# Patient Record
Sex: FEMALE | Race: WHITE
[De-identification: names, ages, dates, MRNs, and addresses within clinical notes are randomized per-mention and may not be internally consistent; named-entity substitution may affect disease eponyms.]

---

## 2017-01-03 ENCOUNTER — APPOINTMENT (OUTPATIENT)
Dept: ENDOCRINOLOGY | Facility: CLINIC | Age: 27
End: 2017-01-03

## 2017-03-15 ENCOUNTER — RX RENEWAL (OUTPATIENT)
Age: 27
End: 2017-03-15

## 2017-05-19 ENCOUNTER — RX RENEWAL (OUTPATIENT)
Age: 27
End: 2017-05-19

## 2017-09-06 ENCOUNTER — APPOINTMENT (OUTPATIENT)
Dept: ENDOCRINOLOGY | Facility: CLINIC | Age: 27
End: 2017-09-06
Payer: MEDICAID

## 2017-09-06 VITALS
HEART RATE: 69 BPM | SYSTOLIC BLOOD PRESSURE: 114 MMHG | BODY MASS INDEX: 35.14 KG/M2 | HEIGHT: 60 IN | WEIGHT: 179 LBS | OXYGEN SATURATION: 97 % | DIASTOLIC BLOOD PRESSURE: 70 MMHG

## 2017-09-06 PROCEDURE — 99214 OFFICE O/P EST MOD 30 MIN: CPT

## 2017-09-07 ENCOUNTER — MEDICATION RENEWAL (OUTPATIENT)
Age: 27
End: 2017-09-07

## 2017-09-07 LAB
25(OH)D3 SERPL-MCNC: 14.6 NG/ML
ALBUMIN SERPL ELPH-MCNC: 4.4 G/DL
ALP BLD-CCNC: 90 U/L
ALT SERPL-CCNC: 17 U/L
ANION GAP SERPL CALC-SCNC: 16 MMOL/L
AST SERPL-CCNC: 20 U/L
BILIRUB SERPL-MCNC: 0.2 MG/DL
BUN SERPL-MCNC: 11 MG/DL
CALCIUM SERPL-MCNC: 9.4 MG/DL
CHLORIDE SERPL-SCNC: 103 MMOL/L
CO2 SERPL-SCNC: 22 MMOL/L
CREAT SERPL-MCNC: 0.8 MG/DL
GLUCOSE SERPL-MCNC: 87 MG/DL
HBA1C MFR BLD HPLC: 4.9 %
POTASSIUM SERPL-SCNC: 4.3 MMOL/L
PROT SERPL-MCNC: 7.6 G/DL
SODIUM SERPL-SCNC: 141 MMOL/L
T4 FREE SERPL-MCNC: 2.3 NG/DL
TSH SERPL-ACNC: 0.06 UIU/ML

## 2017-11-30 ENCOUNTER — RX RENEWAL (OUTPATIENT)
Age: 27
End: 2017-11-30

## 2017-12-11 ENCOUNTER — RX RENEWAL (OUTPATIENT)
Age: 27
End: 2017-12-11

## 2018-03-01 ENCOUNTER — CLINICAL ADVICE (OUTPATIENT)
Age: 28
End: 2018-03-01

## 2018-03-12 ENCOUNTER — RX RENEWAL (OUTPATIENT)
Age: 28
End: 2018-03-12

## 2018-03-14 ENCOUNTER — APPOINTMENT (OUTPATIENT)
Dept: ENDOCRINOLOGY | Facility: CLINIC | Age: 28
End: 2018-03-14
Payer: MEDICAID

## 2018-03-14 VITALS
OXYGEN SATURATION: 98 % | WEIGHT: 173 LBS | HEART RATE: 70 BPM | BODY MASS INDEX: 33.96 KG/M2 | SYSTOLIC BLOOD PRESSURE: 110 MMHG | DIASTOLIC BLOOD PRESSURE: 70 MMHG | HEIGHT: 60 IN

## 2018-03-14 DIAGNOSIS — E28.2 POLYCYSTIC OVARIAN SYNDROME: ICD-10-CM

## 2018-03-14 DIAGNOSIS — E55.9 VITAMIN D DEFICIENCY, UNSPECIFIED: ICD-10-CM

## 2018-03-14 PROCEDURE — 99214 OFFICE O/P EST MOD 30 MIN: CPT

## 2018-03-15 LAB
25(OH)D3 SERPL-MCNC: 19.8 NG/ML
T4 FREE SERPL-MCNC: 1.7 NG/DL
TSH SERPL-ACNC: 1.65 UIU/ML

## 2018-03-15 RX ORDER — CHOLECALCIFEROL (VITAMIN D3) 1250 MCG
1.25 MG CAPSULE ORAL
Qty: 12 | Refills: 0 | Status: DISCONTINUED | COMMUNITY
Start: 2017-09-07 | End: 2018-03-15

## 2018-09-19 ENCOUNTER — APPOINTMENT (OUTPATIENT)
Dept: ENDOCRINOLOGY | Facility: CLINIC | Age: 28
End: 2018-09-19

## 2018-10-15 ENCOUNTER — APPOINTMENT (OUTPATIENT)
Dept: ENDOCRINOLOGY | Facility: CLINIC | Age: 28
End: 2018-10-15
Payer: MEDICAID

## 2018-10-15 VITALS
BODY MASS INDEX: 36.32 KG/M2 | HEART RATE: 66 BPM | SYSTOLIC BLOOD PRESSURE: 122 MMHG | DIASTOLIC BLOOD PRESSURE: 66 MMHG | HEIGHT: 60 IN | OXYGEN SATURATION: 98 % | WEIGHT: 185 LBS

## 2018-10-15 PROCEDURE — 99214 OFFICE O/P EST MOD 30 MIN: CPT

## 2018-10-16 ENCOUNTER — OTHER (OUTPATIENT)
Age: 28
End: 2018-10-16

## 2018-10-16 LAB
T4 SERPL-MCNC: 12.8 UG/DL
TSH SERPL-ACNC: 8.19 UIU/ML

## 2018-11-26 ENCOUNTER — APPOINTMENT (OUTPATIENT)
Dept: ENDOCRINOLOGY | Facility: CLINIC | Age: 28
End: 2018-11-26
Payer: MEDICAID

## 2018-11-26 VITALS
SYSTOLIC BLOOD PRESSURE: 120 MMHG | HEIGHT: 60 IN | WEIGHT: 186 LBS | OXYGEN SATURATION: 98 % | BODY MASS INDEX: 36.52 KG/M2 | DIASTOLIC BLOOD PRESSURE: 70 MMHG | HEART RATE: 88 BPM

## 2018-11-26 DIAGNOSIS — E89.0 POSTPROCEDURAL HYPOTHYROIDISM: ICD-10-CM

## 2018-11-26 DIAGNOSIS — Z33.3 PREGNANT STATE, GESTATIONAL CARRIER: ICD-10-CM

## 2018-11-26 PROCEDURE — 99214 OFFICE O/P EST MOD 30 MIN: CPT

## 2018-11-26 RX ORDER — LEVOTHYROXINE SODIUM 0.2 MG/1
200 TABLET ORAL DAILY
Qty: 30 | Refills: 4 | Status: ACTIVE | COMMUNITY
Start: 2018-11-26 | End: 1900-01-01

## 2018-11-27 LAB
T4 SERPL-MCNC: 13.1 UG/DL
TSH SERPL-ACNC: 2.32 UIU/ML

## 2019-03-09 ENCOUNTER — OUTPATIENT (OUTPATIENT)
Dept: OUTPATIENT SERVICES | Facility: HOSPITAL | Age: 29
LOS: 1 days | End: 2019-03-09
Payer: MEDICAID

## 2019-03-09 VITALS — TEMPERATURE: 98 F

## 2019-03-09 DIAGNOSIS — Z3A.00 WEEKS OF GESTATION OF PREGNANCY NOT SPECIFIED: ICD-10-CM

## 2019-03-09 DIAGNOSIS — O26.899 OTHER SPECIFIED PREGNANCY RELATED CONDITIONS, UNSPECIFIED TRIMESTER: ICD-10-CM

## 2019-03-09 PROCEDURE — 99213 OFFICE O/P EST LOW 20 MIN: CPT | Mod: 25

## 2019-03-09 PROCEDURE — 59025 FETAL NON-STRESS TEST: CPT | Mod: 26

## 2019-03-09 PROCEDURE — 76815 OB US LIMITED FETUS(S): CPT | Mod: 26

## 2019-03-12 ENCOUNTER — RX RENEWAL (OUTPATIENT)
Age: 29
End: 2019-03-12

## 2019-04-05 ENCOUNTER — OUTPATIENT (OUTPATIENT)
Dept: INPATIENT UNIT | Facility: HOSPITAL | Age: 29
LOS: 1 days | Discharge: HOME | End: 2019-04-05
Payer: MEDICAID

## 2019-04-05 ENCOUNTER — OUTPATIENT (OUTPATIENT)
Dept: OUTPATIENT SERVICES | Facility: HOSPITAL | Age: 29
LOS: 1 days | End: 2019-04-05

## 2019-04-05 VITALS
HEART RATE: 98 BPM | SYSTOLIC BLOOD PRESSURE: 112 MMHG | TEMPERATURE: 98 F | WEIGHT: 210.1 LBS | RESPIRATION RATE: 16 BRPM | HEIGHT: 60.5 IN | OXYGEN SATURATION: 99 % | DIASTOLIC BLOOD PRESSURE: 70 MMHG

## 2019-04-05 VITALS — TEMPERATURE: 97 F

## 2019-04-05 DIAGNOSIS — Z90.5 ACQUIRED ABSENCE OF KIDNEY: Chronic | ICD-10-CM

## 2019-04-05 DIAGNOSIS — Z98.891 HISTORY OF UTERINE SCAR FROM PREVIOUS SURGERY: Chronic | ICD-10-CM

## 2019-04-05 DIAGNOSIS — O26.899 OTHER SPECIFIED PREGNANCY RELATED CONDITIONS, UNSPECIFIED TRIMESTER: ICD-10-CM

## 2019-04-05 DIAGNOSIS — Z98.890 OTHER SPECIFIED POSTPROCEDURAL STATES: Chronic | ICD-10-CM

## 2019-04-05 DIAGNOSIS — O34.219 MATERNAL CARE FOR UNSPECIFIED TYPE SCAR FROM PREVIOUS CESAREAN DELIVERY: ICD-10-CM

## 2019-04-05 DIAGNOSIS — Z3A.00 WEEKS OF GESTATION OF PREGNANCY NOT SPECIFIED: ICD-10-CM

## 2019-04-05 LAB
ANION GAP SERPL CALC-SCNC: 13 MMO/L — SIGNIFICANT CHANGE UP (ref 7–14)
APPEARANCE UR: SIGNIFICANT CHANGE UP
BACTERIA # UR AUTO: NEGATIVE — SIGNIFICANT CHANGE UP
BASOPHILS # BLD AUTO: 0.02 K/UL — SIGNIFICANT CHANGE UP (ref 0–0.2)
BASOPHILS NFR BLD AUTO: 0.3 % — SIGNIFICANT CHANGE UP (ref 0–2)
BILIRUB UR-MCNC: NEGATIVE — SIGNIFICANT CHANGE UP
BLD GP AB SCN SERPL QL: NEGATIVE — SIGNIFICANT CHANGE UP
BLOOD UR QL VISUAL: NEGATIVE — SIGNIFICANT CHANGE UP
BUN SERPL-MCNC: 6 MG/DL — LOW (ref 7–23)
CALCIUM SERPL-MCNC: 8.9 MG/DL — SIGNIFICANT CHANGE UP (ref 8.4–10.5)
CHLORIDE SERPL-SCNC: 104 MMOL/L — SIGNIFICANT CHANGE UP (ref 98–107)
CO2 SERPL-SCNC: 22 MMOL/L — SIGNIFICANT CHANGE UP (ref 22–31)
COLOR SPEC: SIGNIFICANT CHANGE UP
CREAT SERPL-MCNC: 0.53 MG/DL — SIGNIFICANT CHANGE UP (ref 0.5–1.3)
EOSINOPHIL # BLD AUTO: 0.23 K/UL — SIGNIFICANT CHANGE UP (ref 0–0.5)
EOSINOPHIL NFR BLD AUTO: 2.9 % — SIGNIFICANT CHANGE UP (ref 0–6)
GLUCOSE BLDC GLUCOMTR-MCNC: 95 MG/DL — SIGNIFICANT CHANGE UP (ref 70–99)
GLUCOSE SERPL-MCNC: 32 MG/DL — CRITICAL LOW (ref 70–99)
GLUCOSE UR-MCNC: NEGATIVE — SIGNIFICANT CHANGE UP
HCG SERPL-ACNC: 2488 MIU/ML — SIGNIFICANT CHANGE UP
HCT VFR BLD CALC: 41.1 % — SIGNIFICANT CHANGE UP (ref 34.5–45)
HGB BLD-MCNC: 13.1 G/DL — SIGNIFICANT CHANGE UP (ref 11.5–15.5)
HYALINE CASTS # UR AUTO: NEGATIVE — SIGNIFICANT CHANGE UP
IMM GRANULOCYTES NFR BLD AUTO: 0.9 % — SIGNIFICANT CHANGE UP (ref 0–1.5)
KETONES UR-MCNC: NEGATIVE — SIGNIFICANT CHANGE UP
LEUKOCYTE ESTERASE UR-ACNC: SIGNIFICANT CHANGE UP
LYMPHOCYTES # BLD AUTO: 1.62 K/UL — SIGNIFICANT CHANGE UP (ref 1–3.3)
LYMPHOCYTES # BLD AUTO: 20.7 % — SIGNIFICANT CHANGE UP (ref 13–44)
MCHC RBC-ENTMCNC: 28.5 PG — SIGNIFICANT CHANGE UP (ref 27–34)
MCHC RBC-ENTMCNC: 31.9 % — LOW (ref 32–36)
MCV RBC AUTO: 89.5 FL — SIGNIFICANT CHANGE UP (ref 80–100)
MONOCYTES # BLD AUTO: 0.87 K/UL — SIGNIFICANT CHANGE UP (ref 0–0.9)
MONOCYTES NFR BLD AUTO: 11.1 % — SIGNIFICANT CHANGE UP (ref 2–14)
NEUTROPHILS # BLD AUTO: 5.03 K/UL — SIGNIFICANT CHANGE UP (ref 1.8–7.4)
NEUTROPHILS NFR BLD AUTO: 64.1 % — SIGNIFICANT CHANGE UP (ref 43–77)
NITRITE UR-MCNC: NEGATIVE — SIGNIFICANT CHANGE UP
NRBC # FLD: 0 K/UL — SIGNIFICANT CHANGE UP (ref 0–0)
PH UR: 6.5 — SIGNIFICANT CHANGE UP (ref 5–8)
PLATELET # BLD AUTO: 198 K/UL — SIGNIFICANT CHANGE UP (ref 150–400)
PMV BLD: 11.4 FL — SIGNIFICANT CHANGE UP (ref 7–13)
POTASSIUM SERPL-MCNC: 3.9 MMOL/L — SIGNIFICANT CHANGE UP (ref 3.5–5.3)
POTASSIUM SERPL-SCNC: 3.9 MMOL/L — SIGNIFICANT CHANGE UP (ref 3.5–5.3)
PROT UR-MCNC: NEGATIVE — SIGNIFICANT CHANGE UP
RBC # BLD: 4.59 M/UL — SIGNIFICANT CHANGE UP (ref 3.8–5.2)
RBC # FLD: 14.7 % — HIGH (ref 10.3–14.5)
RBC CASTS # UR COMP ASSIST: SIGNIFICANT CHANGE UP (ref 0–?)
RH IG SCN BLD-IMP: POSITIVE — SIGNIFICANT CHANGE UP
SODIUM SERPL-SCNC: 139 MMOL/L — SIGNIFICANT CHANGE UP (ref 135–145)
SP GR SPEC: 1.01 — SIGNIFICANT CHANGE UP (ref 1–1.04)
SQUAMOUS # UR AUTO: SIGNIFICANT CHANGE UP
UROBILINOGEN FLD QL: NORMAL — SIGNIFICANT CHANGE UP
WBC # BLD: 7.84 K/UL — SIGNIFICANT CHANGE UP (ref 3.8–10.5)
WBC # FLD AUTO: 7.84 K/UL — SIGNIFICANT CHANGE UP (ref 3.8–10.5)
WBC UR QL: HIGH (ref 0–?)

## 2019-04-05 PROCEDURE — 99214 OFFICE O/P EST MOD 30 MIN: CPT

## 2019-04-05 NOTE — OB RN TRIAGE NOTE - PSH
H/O left nephrectomy    H/O thyroidectomy    History of  section  2016 Female 8#1  History of incision and drainage  2016 after the c/s s/p seroma and fever

## 2019-04-05 NOTE — OB RN TRIAGE NOTE - PMH
Graves disease  at , s/p treatment with radioactive iodine, then thyroidectomy   (normal spontaneous vaginal delivery)  2012 FT Female 6#10  Polycystic kidney  left(S/P Nephrectomy )

## 2019-04-05 NOTE — OB PROVIDER TRIAGE NOTE - NS_CHIEFCOMPLAINTOTHER_OBGYN_ALL_OB_FT
serum glucose drawn 10am 4/5/19 resulted at 350p @ 4/5/19 : 35 mg/dl serum glucose drawn 10am 4/5/19 resulted at 350p @ 4/5/19 at PST:  32 mg/dl

## 2019-04-05 NOTE — PROVIDER CONTACT NOTE (CRITICAL VALUE NOTIFICATION) - ACTION/TREATMENT ORDERED:
Patient was called and assessed via phone. Unable to reach PCP & Dr. Nuñez (her regular OB/GYN) at present. Dr. James was made aware. Advised to go to urgent care for finger stick which patient agreed. Patient was called and assessed via phone. Unable to reach PCP & Dr. Nuñez (her regular OB/GYN) at present. Advised to go to urgent care for finger stick which patient agreed. Dr. James was made aware.

## 2019-04-05 NOTE — OB PROVIDER TRIAGE NOTE - NSOBPROVIDERNOTE_OBGYN_ALL_OB_FT
28 y.o.  AKHIL 19 @ 37.6 weeks r/o hypoglycemia. Serum glucose drawn 10am 19 resulted at 350p @ 19 at PST:  32 mg/dl. "Dr Jayson Laird called me and told me to go to hospital." Pt denies LOF, VB, ctx, dizziness, weakness, tremors. States +FM.    abdomen soft, nontender  nst reactive  toco: no ctx noted    VSS  FS 95    Discussed with Dr Ramos. Pt discharged home with labor precautions/fetal kick counts reviewed. Encouraged increased PO hydration. F/U next scheduled prenatal appointment 19.    Bret, NP

## 2019-04-05 NOTE — OB PST NOTE - NSANTHOSAYNRD_GEN_A_CORE
No. KAT screening performed.  STOP BANG Legend: 0-2 = LOW Risk; 3-4 = INTERMEDIATE Risk; 5-8 = HIGH Risk

## 2019-04-05 NOTE — OB RN TRIAGE NOTE - NS_MATERNALCONCERNS_OBGYN_ALL_OB_FT
Polycystic kidney of the left side (congenital) only right side kidney at present, S/P nephrectomy in 1993

## 2019-04-05 NOTE — OB PST NOTE - NSHPPHYSICALEXAM_GEN_ALL_CORE
Constitutional: Well Developed, Well Groomed, Well Nourished, No Distress    Eyes: PERRL, EOMI, conjunctiva clear    Ears: Normal    Mouth & Gums: Normal, moist    Pharynx: No tenderness, discharge, or peritonsillar abscess    Tonsils: No Redness, discharge, tenderness, or swelling    Neck: Supple    Breast: declined exam    Back: Normal shape, ROM intact, strength intact, no vertebral tenderness    Respiratory: Airway patent, breath sounds equal, good air movement, respiration non-labored, clear to auscultation bilateral, no chest wall tenderness, no intercostal retractions, no rales, no wheezes, no rhonchi, no subcutaneous emphysema    Cardiovascular:  Regular rate and rhythm, no rubs or murmur, normal PMI    Gastrointestinal: Soft, non-tender, non distention, no masses palpable, bowel sound normal, , no rebound tenderness    Extremities: 1+ pedal edema, No clubbing, cyanosis    Vascular:  varicose veins noted    Neurological: alert & oriented x 3, sensation intact,  normal strength    Skin: warm and dry, normal color    Lymph Nodes: normal posterior cervical lymph node, normal anterior cervical lymph node, normal supraclavicular lymph node,     Musculoskeletal: ROM intact, no joint swelling, warmth, or calf tenderness. Normal strength    Psychiatric: normal affect, normal behavior

## 2019-04-05 NOTE — OB PST NOTE - PROBLEM SELECTOR PLAN 1
Pt scheduled for surgery on 4/20/19.  Pre-op instructions provided. Pt verbalized understanding.   Chlorhexidine wash provided and instructions given.   Pt advised medication instructions per OB.

## 2019-04-05 NOTE — OB RN TRIAGE NOTE - NS_FETALANOMALIESDETAILS_OBGYN_ALL_OB_FT
Need to be followed up by the peds after th ebirth of the baby Need to be followed up by the peds after the birth of the baby

## 2019-04-05 NOTE — OB PROVIDER TRIAGE NOTE - ADDITIONAL INSTRUCTIONS
Please go to your next scheduled prenatal appointment on 4/9/19. Please drink 1-2 liters of fluid per day.

## 2019-04-05 NOTE — OB PST NOTE - HISTORY OF PRESENT ILLNESS
28 year old female presents for presurgical evaluation for Repeat  Section scheduled on 19. 28 year old female presents for presurgical evaluation for Repeat  Section scheduled on 19.     NO  WORKSHEET AVAILABLE - INFORMATION PER PATIENT

## 2019-04-05 NOTE — OB PST NOTE - NSHPREVIEWOFSYSTEMS_GEN_ALL_CORE
General: No fever, chills, sweating, anorexia, weight loss or weight gain. No polyphagia, polyurea, polydypsia, malaise, or fatigue    Skin: No rashes, itching, or dryness. No change in size/color of moles. No tumors, brittle nails, pitted nails, or hair loss    Breast: No tenderness, lumps, or nipple discharge      Ophthalmologic: No diplopia, photophobia, lacrimation, blurred Vision , or eye discharge    ENMT Symptoms: seasonal allergies, some sinus congestion. No hearing difficulty, ear pain, tinnitus, or vertigo.    Respiratory and Thorax: No wheezing, dyspnea, cough, hemoptysis, or pleuritic chest pain     Cardiovascular: Pedal edema with pregnancy. No chest pain, palpitations, dyspnea on exertion, orthopnea, paroxysmal nocturnal dyspnea,   or claudication    Gastrointestinal: No nausea, vomiting, diarrhea, constipation, change in bowel habits, flatulence, abdominal pain, or melena    Genitourinary/ Pelvis: No hematuria, renal colic, or flank pain.  No urine discoloration, incontinence, dysuria, or urinary hesitancy. Normal urinary frequency. No nocturia, abnormal vaginal bleeding, vaginal discharge, spotting, pelvic pain, or vaginal leakage    Musculoskeletal: No arthralgia, arthritis, joint swelling, muscle cramping, muscle weakness, neck pain, arm pain, or leg pain    Neurological: Parathesias to right leg, fingers - with pregnancy. No transient paralysis, weakness, , or seizures. No syncope, tremors, vertigo, loss of sensation, difficulty walking, loss of consciousness, hemiparesis, confusion, or facial palsy    Psychiatric: Anxiety - no meds. No suicidal ideation, depression, insomnia, memory loss, paranoia, mood swings, agitation, hallucinations, or hyperactivity    Hematology: No gum bleeding, nose bleeding, or skin lumps    Lymphatic: No enlarged or tender lymph nodes. No extremity swelling    Endocrine: No heat or cold intolerance    Immunologic: No recurrent or persistent infections    Vascular - varicose veins towards end of pregnancy General: No fever, chills, sweating, anorexia. No polyphagia, polyurea, polydypsia, malaise, or fatigue    Skin: No rashes, itching, or dryness. No change in size/color of moles. No tumors, brittle nails, pitted nails, or hair loss    Breast: No tenderness, lumps, or nipple discharge      Ophthalmologic: No diplopia, photophobia, lacrimation, blurred Vision , or eye discharge    ENMT Symptoms: seasonal allergies, some sinus congestion. No hearing difficulty, ear pain, tinnitus, or vertigo.    Respiratory and Thorax: No wheezing, dyspnea, cough, hemoptysis, or pleuritic chest pain     Cardiovascular: Pedal edema with pregnancy. No chest pain, palpitations, dyspnea on exertion, orthopnea, paroxysmal nocturnal dyspnea,   or claudication    Gastrointestinal: No nausea, vomiting, diarrhea, constipation, change in bowel habits, flatulence, abdominal pain, or melena    Genitourinary/ Pelvis: No hematuria, renal colic, or flank pain.  No urine discoloration, incontinence, dysuria, or urinary hesitancy. Normal urinary frequency. No nocturia, abnormal vaginal bleeding, vaginal discharge, spotting, pelvic pain, or vaginal leakage    Musculoskeletal: No arthralgia, arthritis, joint swelling, muscle cramping, muscle weakness, neck pain, arm pain, or leg pain    Neurological: Parathesias to right leg, fingers - with pregnancy. No transient paralysis, weakness, , or seizures. No syncope, tremors, vertigo, loss of sensation, difficulty walking, loss of consciousness, hemiparesis, confusion, or facial palsy    Psychiatric: Anxiety - no meds. No suicidal ideation, depression, insomnia, memory loss, paranoia, mood swings, agitation, hallucinations, or hyperactivity    Hematology: No gum bleeding, nose bleeding, or skin lumps    Lymphatic: No enlarged or tender lymph nodes. No extremity swelling    Endocrine: No heat or cold intolerance    Immunologic: No recurrent or persistent infections    Vascular - varicose veins towards end of pregnancy

## 2019-04-05 NOTE — OB RN TRIAGE NOTE - CHIEF COMPLAINT QUOTE
"I was called by the presurgical testing for glucose 35, and my doctor as ked me to come and check in triage" "I was called by the presurgical testing for glucose 35, and my doctor asked me to come and check in triage"

## 2019-04-05 NOTE — PROVIDER CONTACT NOTE (CRITICAL VALUE NOTIFICATION) - BACKGROUND
29 yo scheduled for repeat  section on 2019. Patient has hx of graves disease, s/o treatment with radioactive iodine & thyroidectomy, polycystic kidney s/p left nephrectomy

## 2019-04-06 LAB — T PALLIDUM AB TITR SER: NEGATIVE — SIGNIFICANT CHANGE UP

## 2019-04-09 ENCOUNTER — OUTPATIENT (OUTPATIENT)
Dept: OUTPATIENT SERVICES | Facility: HOSPITAL | Age: 29
LOS: 1 days | Discharge: HOME | End: 2019-04-09
Payer: MEDICAID

## 2019-04-09 VITALS — TEMPERATURE: 99 F | HEART RATE: 94 BPM | DIASTOLIC BLOOD PRESSURE: 67 MMHG | SYSTOLIC BLOOD PRESSURE: 127 MMHG

## 2019-04-09 VITALS
HEART RATE: 94 BPM | SYSTOLIC BLOOD PRESSURE: 127 MMHG | TEMPERATURE: 99 F | RESPIRATION RATE: 16 BRPM | DIASTOLIC BLOOD PRESSURE: 67 MMHG

## 2019-04-09 DIAGNOSIS — Z98.890 OTHER SPECIFIED POSTPROCEDURAL STATES: Chronic | ICD-10-CM

## 2019-04-09 DIAGNOSIS — O26.899 OTHER SPECIFIED PREGNANCY RELATED CONDITIONS, UNSPECIFIED TRIMESTER: ICD-10-CM

## 2019-04-09 DIAGNOSIS — Z3A.00 WEEKS OF GESTATION OF PREGNANCY NOT SPECIFIED: ICD-10-CM

## 2019-04-09 DIAGNOSIS — Z90.5 ACQUIRED ABSENCE OF KIDNEY: Chronic | ICD-10-CM

## 2019-04-09 DIAGNOSIS — Z98.891 HISTORY OF UTERINE SCAR FROM PREVIOUS SURGERY: Chronic | ICD-10-CM

## 2019-04-09 PROCEDURE — 59025 FETAL NON-STRESS TEST: CPT | Mod: 26

## 2019-04-09 NOTE — OB PROVIDER TRIAGE NOTE - HISTORY OF PRESENT ILLNESS
JOHN IVORY  28y  Female [id number]  28y  at 38w2d sent to triage by OB for NST/BPP due to nuchal presentation. Pt also states feeling some irregular, mild contractions.   Reports +FM, no vaginal bleeding, no ROM or LOF  AP complications: Denies    Fetus in breech presentation, Scheduled for repeat C/s on 19

## 2019-04-09 NOTE — OB PROVIDER TRIAGE NOTE - NSHPPHYSICALEXAM_GEN_ALL_CORE
Vital Signs Last 24 Hrs  T(C): 37.1 (09 Apr 2019 16:48), Max: 37.1 (09 Apr 2019 16:23)  T(F): 98.78 (09 Apr 2019 16:48), Max: 98.8 (09 Apr 2019 16:23)  HR: 94 (09 Apr 2019 16:48) (94 - 94)  BP: 127/67 (09 Apr 2019 16:48) (127/67 - 127/67)  BP(mean): --  RR: 16 (09 Apr 2019 16:23) (16 - 16)    Abdomen: Gravidm soft, NT  NST: Reactive, mod variability, no decels  [address]: no contractions  VE: closed/40/post/high  TAS: SLIUP, Breech, placenta anterior, BPP 8/8, DAFNE: 15

## 2019-04-09 NOTE — OB PROVIDER TRIAGE NOTE - NSOBPROVIDERNOTE_OBGYN_ALL_OB_FT
29 y/o  at 38w3d sent to triage by OB for evaluation due to    desires    PNC: Jayson Mcqueen  AP Course uncomplicated per patient    MSH/GYN/OBH:  nsd- 6-10   c/s-- 8-1 oligo fetal distress and failure to dilate (Arrest at 8 cm)  1 MAB    Desires      L kidney- removed @ 2 yr of age  Graves Disease -thyroidectomy/ on synthroid  Anxiety Disorder w/ counseling in the past (No MEDS)    On evaluation:  NAD  VSS/Afebrile  Abdomen is soft NT/ Gravid with increased BMI  NST in Progress  TAS:   FHR: Cat 1, Reactive  TOCO: No CTX  - Case Dw  (OB Covering Attending)   -Patient acknowledges good FM's with reassuring  maternal /fetal testing   - Okay for DC with fetal kick counts and OB F/U as scheduled with Dr Mcqueen   - Patient aware that Dr Mcqueen is to schedule a 40 wk repeat C/S if not delivered   - Verbalized understanding

## 2019-04-09 NOTE — OB PROVIDER TRIAGE NOTE - ADDITIONAL INSTRUCTIONS
Patient discharged home with instructions  Labor precautions  Pt to monitor fetal kick counts  Pt to follow up with OB as scheduled Patient discharged home with instructions  As per Dr Calloway patient needs to follow up with OB tomorrow morning  Labor precautions  Pt to monitor fetal kick counts

## 2019-04-09 NOTE — OB RN TRIAGE NOTE - CHIEF COMPLAINT QUOTE
sched c/s on 4/20, breech , c/o mild ctxs,nervous about cord prolapse . states dr blackwell told her that umbilical cord is presenting on sono

## 2019-04-10 PROBLEM — Q61.3 POLYCYSTIC KIDNEY, UNSPECIFIED: Chronic | Status: ACTIVE | Noted: 2019-04-05

## 2019-04-10 PROBLEM — E05.00 THYROTOXICOSIS WITH DIFFUSE GOITER WITHOUT THYROTOXIC CRISIS OR STORM: Chronic | Status: ACTIVE | Noted: 2019-04-05

## 2019-04-12 ENCOUNTER — APPOINTMENT (OUTPATIENT)
Dept: ANTEPARTUM | Facility: CLINIC | Age: 29
End: 2019-04-12
Payer: MEDICAID

## 2019-04-12 ENCOUNTER — ASOB RESULT (OUTPATIENT)
Age: 29
End: 2019-04-12

## 2019-04-12 PROCEDURE — 76819 FETAL BIOPHYS PROFIL W/O NST: CPT

## 2019-04-12 PROCEDURE — 76817 TRANSVAGINAL US OBSTETRIC: CPT

## 2019-04-12 PROCEDURE — 76811 OB US DETAILED SNGL FETUS: CPT

## 2019-04-19 ENCOUNTER — TRANSCRIPTION ENCOUNTER (OUTPATIENT)
Age: 29
End: 2019-04-19

## 2019-04-20 ENCOUNTER — RESULT REVIEW (OUTPATIENT)
Age: 29
End: 2019-04-20

## 2019-04-20 ENCOUNTER — INPATIENT (INPATIENT)
Facility: HOSPITAL | Age: 29
LOS: 2 days | Discharge: HOME | End: 2019-04-23
Attending: SPECIALIST | Admitting: SPECIALIST
Payer: MEDICAID

## 2019-04-20 ENCOUNTER — TRANSCRIPTION ENCOUNTER (OUTPATIENT)
Age: 29
End: 2019-04-20

## 2019-04-20 VITALS
HEIGHT: 60 IN | HEART RATE: 64 BPM | TEMPERATURE: 98 F | SYSTOLIC BLOOD PRESSURE: 103 MMHG | WEIGHT: 210.1 LBS | RESPIRATION RATE: 16 BRPM | DIASTOLIC BLOOD PRESSURE: 51 MMHG

## 2019-04-20 DIAGNOSIS — Z98.891 HISTORY OF UTERINE SCAR FROM PREVIOUS SURGERY: Chronic | ICD-10-CM

## 2019-04-20 DIAGNOSIS — Z98.890 OTHER SPECIFIED POSTPROCEDURAL STATES: Chronic | ICD-10-CM

## 2019-04-20 DIAGNOSIS — O34.219 MATERNAL CARE FOR UNSPECIFIED TYPE SCAR FROM PREVIOUS CESAREAN DELIVERY: ICD-10-CM

## 2019-04-20 DIAGNOSIS — Z90.5 ACQUIRED ABSENCE OF KIDNEY: Chronic | ICD-10-CM

## 2019-04-20 LAB
BASOPHILS # BLD AUTO: 0.04 K/UL — SIGNIFICANT CHANGE UP (ref 0–0.2)
BASOPHILS NFR BLD AUTO: 0.5 % — SIGNIFICANT CHANGE UP (ref 0–2)
BLD GP AB SCN SERPL QL: NEGATIVE — SIGNIFICANT CHANGE UP
EOSINOPHIL # BLD AUTO: 0.22 K/UL — SIGNIFICANT CHANGE UP (ref 0–0.5)
EOSINOPHIL NFR BLD AUTO: 2.6 % — SIGNIFICANT CHANGE UP (ref 0–6)
HCT VFR BLD CALC: 40.6 % — SIGNIFICANT CHANGE UP (ref 34.5–45)
HGB BLD-MCNC: 13 G/DL — SIGNIFICANT CHANGE UP (ref 11.5–15.5)
IMM GRANULOCYTES NFR BLD AUTO: 1 % — SIGNIFICANT CHANGE UP (ref 0–1.5)
LYMPHOCYTES # BLD AUTO: 1.74 K/UL — SIGNIFICANT CHANGE UP (ref 1–3.3)
LYMPHOCYTES # BLD AUTO: 20.7 % — SIGNIFICANT CHANGE UP (ref 13–44)
MCHC RBC-ENTMCNC: 27.9 PG — SIGNIFICANT CHANGE UP (ref 27–34)
MCHC RBC-ENTMCNC: 32 % — SIGNIFICANT CHANGE UP (ref 32–36)
MCV RBC AUTO: 87.1 FL — SIGNIFICANT CHANGE UP (ref 80–100)
MONOCYTES # BLD AUTO: 0.61 K/UL — SIGNIFICANT CHANGE UP (ref 0–0.9)
MONOCYTES NFR BLD AUTO: 7.3 % — SIGNIFICANT CHANGE UP (ref 2–14)
NEUTROPHILS # BLD AUTO: 5.7 K/UL — SIGNIFICANT CHANGE UP (ref 1.8–7.4)
NEUTROPHILS NFR BLD AUTO: 67.9 % — SIGNIFICANT CHANGE UP (ref 43–77)
NRBC # FLD: 0 K/UL — SIGNIFICANT CHANGE UP (ref 0–0)
PLATELET # BLD AUTO: 212 K/UL — SIGNIFICANT CHANGE UP (ref 150–400)
PMV BLD: 11.1 FL — SIGNIFICANT CHANGE UP (ref 7–13)
RBC # BLD: 4.66 M/UL — SIGNIFICANT CHANGE UP (ref 3.8–5.2)
RBC # FLD: 14.4 % — SIGNIFICANT CHANGE UP (ref 10.3–14.5)
RH IG SCN BLD-IMP: POSITIVE — SIGNIFICANT CHANGE UP
WBC # BLD: 8.39 K/UL — SIGNIFICANT CHANGE UP (ref 3.8–10.5)
WBC # FLD AUTO: 8.39 K/UL — SIGNIFICANT CHANGE UP (ref 3.8–10.5)

## 2019-04-20 PROCEDURE — 88307 TISSUE EXAM BY PATHOLOGIST: CPT | Mod: 26

## 2019-04-20 PROCEDURE — 88302 TISSUE EXAM BY PATHOLOGIST: CPT | Mod: 26

## 2019-04-20 RX ORDER — OXYCODONE HYDROCHLORIDE 5 MG/1
10 TABLET ORAL
Qty: 0 | Refills: 0 | Status: DISCONTINUED | OUTPATIENT
Start: 2019-04-20 | End: 2019-04-20

## 2019-04-20 RX ORDER — HYDROMORPHONE HYDROCHLORIDE 2 MG/ML
0.5 INJECTION INTRAMUSCULAR; INTRAVENOUS; SUBCUTANEOUS
Qty: 0 | Refills: 0 | Status: DISCONTINUED | OUTPATIENT
Start: 2019-04-20 | End: 2019-04-21

## 2019-04-20 RX ORDER — METOCLOPRAMIDE HCL 10 MG
10 TABLET ORAL ONCE
Qty: 0 | Refills: 0 | Status: COMPLETED | OUTPATIENT
Start: 2019-04-20 | End: 2019-04-20

## 2019-04-20 RX ORDER — SODIUM CHLORIDE 9 MG/ML
1000 INJECTION, SOLUTION INTRAVENOUS ONCE
Qty: 0 | Refills: 0 | Status: COMPLETED | OUTPATIENT
Start: 2019-04-20 | End: 2019-04-20

## 2019-04-20 RX ORDER — OXYCODONE HYDROCHLORIDE 5 MG/1
5 TABLET ORAL
Qty: 0 | Refills: 0 | Status: DISCONTINUED | OUTPATIENT
Start: 2019-04-20 | End: 2019-04-20

## 2019-04-20 RX ORDER — LEVOTHYROXINE SODIUM 125 MCG
200 TABLET ORAL DAILY
Qty: 0 | Refills: 0 | Status: DISCONTINUED | OUTPATIENT
Start: 2019-04-20 | End: 2019-04-20

## 2019-04-20 RX ORDER — NALBUPHINE HYDROCHLORIDE 10 MG/ML
5 INJECTION, SOLUTION INTRAMUSCULAR; INTRAVENOUS; SUBCUTANEOUS EVERY 6 HOURS
Qty: 0 | Refills: 0 | Status: DISCONTINUED | OUTPATIENT
Start: 2019-04-20 | End: 2019-04-22

## 2019-04-20 RX ORDER — LEVOTHYROXINE SODIUM 125 MCG
1 TABLET ORAL
Qty: 0 | Refills: 0 | COMMUNITY

## 2019-04-20 RX ORDER — ONDANSETRON 8 MG/1
4 TABLET, FILM COATED ORAL EVERY 6 HOURS
Qty: 0 | Refills: 0 | Status: DISCONTINUED | OUTPATIENT
Start: 2019-04-20 | End: 2019-04-22

## 2019-04-20 RX ORDER — NALOXONE HYDROCHLORIDE 4 MG/.1ML
0.1 SPRAY NASAL
Qty: 0 | Refills: 0 | Status: DISCONTINUED | OUTPATIENT
Start: 2019-04-20 | End: 2019-04-22

## 2019-04-20 RX ORDER — HYDROMORPHONE HYDROCHLORIDE 2 MG/ML
1 INJECTION INTRAMUSCULAR; INTRAVENOUS; SUBCUTANEOUS
Qty: 0 | Refills: 0 | Status: DISCONTINUED | OUTPATIENT
Start: 2019-04-20 | End: 2019-04-20

## 2019-04-20 RX ORDER — CITRIC ACID/SODIUM CITRATE 300-500 MG
30 SOLUTION, ORAL ORAL ONCE
Qty: 0 | Refills: 0 | Status: COMPLETED | OUTPATIENT
Start: 2019-04-20 | End: 2019-04-20

## 2019-04-20 RX ORDER — ACETAMINOPHEN 500 MG
975 TABLET ORAL ONCE
Qty: 0 | Refills: 0 | Status: COMPLETED | OUTPATIENT
Start: 2019-04-20 | End: 2019-04-20

## 2019-04-20 RX ORDER — HYDROMORPHONE HYDROCHLORIDE 2 MG/ML
0.25 INJECTION INTRAMUSCULAR; INTRAVENOUS; SUBCUTANEOUS
Qty: 0 | Refills: 0 | Status: DISCONTINUED | OUTPATIENT
Start: 2019-04-20 | End: 2019-04-21

## 2019-04-20 RX ORDER — MORPHINE SULFATE 50 MG/1
0.2 CAPSULE, EXTENDED RELEASE ORAL ONCE
Qty: 0 | Refills: 0 | Status: DISCONTINUED | OUTPATIENT
Start: 2019-04-20 | End: 2019-04-22

## 2019-04-20 RX ORDER — FAMOTIDINE 10 MG/ML
20 INJECTION INTRAVENOUS ONCE
Qty: 0 | Refills: 0 | Status: COMPLETED | OUTPATIENT
Start: 2019-04-20 | End: 2019-04-20

## 2019-04-20 RX ADMIN — SODIUM CHLORIDE 2000 MILLILITER(S): 9 INJECTION, SOLUTION INTRAVENOUS at 18:17

## 2019-04-20 RX ADMIN — Medication 10 MILLIGRAM(S): at 12:36

## 2019-04-20 RX ADMIN — Medication 30 MILLILITER(S): at 18:17

## 2019-04-20 RX ADMIN — Medication 975 MILLIGRAM(S): at 17:00

## 2019-04-20 RX ADMIN — FAMOTIDINE 20 MILLIGRAM(S): 10 INJECTION INTRAVENOUS at 12:35

## 2019-04-20 NOTE — DISCHARGE NOTE OB - MEDICATION SUMMARY - MEDICATIONS TO CHANGE
I will SWITCH the dose or number of times a day I take the medications listed below when I get home from the hospital:    Synthroid 200 mcg (0.2 mg) oral tablet  -- 1 tab(s) by mouth once a day

## 2019-04-20 NOTE — DISCHARGE NOTE OB - MEDICATION SUMMARY - MEDICATIONS TO STOP TAKING
I will STOP taking the medications listed below when I get home from the hospital:    raspberry leaf tea capsules  -- 1 cap(s) by mouth 3 times a day, last dose 4/12/19    Prenatal Multivitamins oral tablet  -- 1 tab(s) by mouth once a day, last dose 4/12/19

## 2019-04-20 NOTE — H&P ADULT - ASSESSMENT
28y  at 40w0d AKHIL 19 prior C section desiring permanent sterilization    - for repeat C section  - surgical consent by Dr. Calloway  - accepts blood transfusion  - preop labs/ meds  - NST prior to OR    S [place], R3

## 2019-04-20 NOTE — H&P ADULT - NSICDXPASTSURGICALHX_GEN_ALL_CORE_FT
PAST SURGICAL HISTORY:  H/O left nephrectomy     H/O thyroidectomy 2015    History of  section 2016 Female 8#1    History of incision and drainage 2016 after the c/s s/p seroma and fever

## 2019-04-20 NOTE — OB NEONATOLOGY/PEDIATRICIAN DELIVERY SUMMARY - NSPEDSNEONOTESA_OBGYN_ALL_OB_FT
Baby is a 40w GA male born to a 27yo  via repeat . Maternal history is complicated by polycystic kidney disease, left nephrectomy, proteinuria, and Grave's disease (s/p thyroidectomy) on Synthroid. Pregnancy history is complicated by a fetal alert of horseshoe kidney that is reported to have resolved. Prenatal labs were negative, immune, and non-reactive. GBS positive. No rupture, no labor. Baby emerged vigorous and crying. Warmed, dried, suctioned, and stimulated. Voided x1 and meconium x1. Apgar 9/9.

## 2019-04-20 NOTE — DISCHARGE NOTE OB - CARE PLAN
Principal Discharge DX:	Encounter for tubal ligation  Goal:	home  Assessment and plan of treatment:	rtc 6 wks Principal Discharge DX:	Encounter for tubal ligation  Goal:	home  Assessment and plan of treatment:	rtc 2 weeks

## 2019-04-20 NOTE — DISCHARGE NOTE OB - CARE PROVIDER_API CALL
Pedro Baxter)  Obstetrics and Gynecology  [address] [place] [address]  Phone: [phone]  Fax: [phone]  Follow Up Time: Poonam Fisher)  Obstetrics and Gynecology  [facility] [place] [address]  Phone: [phone]  Fax: [phone]  Follow Up Time:

## 2019-04-20 NOTE — DISCHARGE NOTE OB - PATIENT PORTAL LINK FT
You can access the [facility] Patient Portal, offered by [facility], by registering with the following website: [email or web address]

## 2019-04-20 NOTE — H&P ADULT - NSICDXPASTMEDICALHX_GEN_ALL_CORE_FT
PAST MEDICAL HISTORY:  Graves disease at , s/p treatment with radioactive iodine, then thyroidectomy     (normal spontaneous vaginal delivery)  FT Female 6#10    Polycystic kidney left(S/P Nephrectomy )

## 2019-04-20 NOTE — DISCHARGE NOTE OB - MEDICATION SUMMARY - MEDICATIONS TO TAKE
I will START or STAY ON the medications listed below when I get home from the hospital:  None I will START or STAY ON the medications listed below when I get home from the hospital:    acetaminophen 325 mg oral tablet  -- 3 tab(s) by mouth every 6 hours  -- Indication: For  delivery delivered    ibuprofen 600 mg oral tablet  -- 1 tab(s) by mouth every 6 hours  -- Indication: For  delivery delivered    levothyroxine 200 mcg (0.2 mg) oral tablet  -- 1 tab(s) by mouth once a day  -- Indication: For routine

## 2019-04-20 NOTE — OB PROVIDER DELIVERY SUMMARY - NSPROVIDERDELIVERYNOTE_OBGYN_ALL_OB_FT
r/c/s and tubal ligation r/c/s and tubal ligation    Gravid uterus, grossly normal appearing bilateral fallopian tubes and ovaries. Liveborn male infant delivered w/ vacuum assist, apgars 9/9. Bilateral tubal ligation performed by modified nikkie technique. Patient went to recovery in stable condition. r/c/s and tubal ligation    Gravid uterus, grossly normal appearing bilateral fallopian tubes and ovaries. Liveborn male infant delivered w/ vacuum assist, apgars 9/9. Bilateral tubal ligation performed by modified nikkie technique. Placenta,portion of left fallopian tube and portion of right fallopian tube sent to pathology. Patient went to recovery in stable condition.

## 2019-04-20 NOTE — H&P ADULT - HISTORY OF PRESENT ILLNESS
28y  at 40w0d AKHIL 19 prior C section, presenting for scheduled repeat C section and BTL. Patient reports irregular contractions, and denies LOF or vaginal bleeding. She feels active fetal movements.       OBHx:  x2 , 2016 emergent C/S   PMSH: Graves disease s/p SOSA, thyroidectomy , left nephrectomy as a  (PKD)  Meds: synthroid 200mcg qd, prenatal vitamins  NKDA  SH: denies smoking, alcohol or other drugs. Feels safe at home    PE  Gen: Comfortable, NAD  Abd: gravid, nontender  Ext: warm/well perfused, no calf tenderness bilaterally

## 2019-04-21 LAB
HCT VFR BLD CALC: 36.1 % — SIGNIFICANT CHANGE UP (ref 34.5–45)
HGB BLD-MCNC: 11.6 G/DL — SIGNIFICANT CHANGE UP (ref 11.5–15.5)
MCHC RBC-ENTMCNC: 27.6 PG — SIGNIFICANT CHANGE UP (ref 27–34)
MCHC RBC-ENTMCNC: 32.1 % — SIGNIFICANT CHANGE UP (ref 32–36)
MCV RBC AUTO: 86 FL — SIGNIFICANT CHANGE UP (ref 80–100)
NRBC # FLD: 0 K/UL — SIGNIFICANT CHANGE UP (ref 0–0)
PLATELET # BLD AUTO: 170 K/UL — SIGNIFICANT CHANGE UP (ref 150–400)
PMV BLD: 10.7 FL — SIGNIFICANT CHANGE UP (ref 7–13)
RBC # BLD: 4.2 M/UL — SIGNIFICANT CHANGE UP (ref 3.8–5.2)
RBC # FLD: 14.3 % — SIGNIFICANT CHANGE UP (ref 10.3–14.5)
T PALLIDUM AB TITR SER: NEGATIVE — SIGNIFICANT CHANGE UP
WBC # BLD: 13.33 K/UL — HIGH (ref 3.8–10.5)
WBC # FLD AUTO: 13.33 K/UL — HIGH (ref 3.8–10.5)

## 2019-04-21 RX ORDER — FERROUS SULFATE 325(65) MG
325 TABLET ORAL DAILY
Qty: 0 | Refills: 0 | Status: DISCONTINUED | OUTPATIENT
Start: 2019-04-21 | End: 2019-04-23

## 2019-04-21 RX ORDER — DIPHENHYDRAMINE HCL 50 MG
25 CAPSULE ORAL EVERY 6 HOURS
Qty: 0 | Refills: 0 | Status: DISCONTINUED | OUTPATIENT
Start: 2019-04-21 | End: 2019-04-23

## 2019-04-21 RX ORDER — LANOLIN
1 OINTMENT (GRAM) TOPICAL
Qty: 0 | Refills: 0 | Status: DISCONTINUED | OUTPATIENT
Start: 2019-04-21 | End: 2019-04-23

## 2019-04-21 RX ORDER — SODIUM CHLORIDE 9 MG/ML
1000 INJECTION, SOLUTION INTRAVENOUS
Qty: 0 | Refills: 0 | Status: DISCONTINUED | OUTPATIENT
Start: 2019-04-21 | End: 2019-04-21

## 2019-04-21 RX ORDER — DOCUSATE SODIUM 100 MG
100 CAPSULE ORAL
Qty: 0 | Refills: 0 | Status: DISCONTINUED | OUTPATIENT
Start: 2019-04-21 | End: 2019-04-23

## 2019-04-21 RX ORDER — HEPARIN SODIUM 5000 [USP'U]/ML
5000 INJECTION INTRAVENOUS; SUBCUTANEOUS EVERY 12 HOURS
Qty: 0 | Refills: 0 | Status: DISCONTINUED | OUTPATIENT
Start: 2019-04-21 | End: 2019-04-23

## 2019-04-21 RX ORDER — GLYCERIN ADULT
1 SUPPOSITORY, RECTAL RECTAL AT BEDTIME
Qty: 0 | Refills: 0 | Status: DISCONTINUED | OUTPATIENT
Start: 2019-04-21 | End: 2019-04-23

## 2019-04-21 RX ORDER — OXYCODONE HYDROCHLORIDE 5 MG/1
5 TABLET ORAL
Qty: 0 | Refills: 0 | Status: DISCONTINUED | OUTPATIENT
Start: 2019-04-21 | End: 2019-04-23

## 2019-04-21 RX ORDER — OXYTOCIN 10 UNIT/ML
125 VIAL (ML) INJECTION
Qty: 20 | Refills: 0 | Status: DISCONTINUED | OUTPATIENT
Start: 2019-04-21 | End: 2019-04-22

## 2019-04-21 RX ORDER — TETANUS TOXOID, REDUCED DIPHTHERIA TOXOID AND ACELLULAR PERTUSSIS VACCINE, ADSORBED 5; 2.5; 8; 8; 2.5 [IU]/.5ML; [IU]/.5ML; UG/.5ML; UG/.5ML; UG/.5ML
0.5 SUSPENSION INTRAMUSCULAR ONCE
Qty: 0 | Refills: 0 | Status: DISCONTINUED | OUTPATIENT
Start: 2019-04-21 | End: 2019-04-23

## 2019-04-21 RX ORDER — IBUPROFEN 200 MG
600 TABLET ORAL EVERY 6 HOURS
Qty: 0 | Refills: 0 | Status: COMPLETED | OUTPATIENT
Start: 2019-04-21 | End: 2020-03-19

## 2019-04-21 RX ORDER — LEVOTHYROXINE SODIUM 125 MCG
200 TABLET ORAL DAILY
Qty: 0 | Refills: 0 | Status: DISCONTINUED | OUTPATIENT
Start: 2019-04-21 | End: 2019-04-23

## 2019-04-21 RX ORDER — OXYCODONE HYDROCHLORIDE 5 MG/1
5 TABLET ORAL EVERY 4 HOURS
Qty: 0 | Refills: 0 | Status: DISCONTINUED | OUTPATIENT
Start: 2019-04-21 | End: 2019-04-23

## 2019-04-21 RX ORDER — ACETAMINOPHEN 500 MG
975 TABLET ORAL EVERY 6 HOURS
Qty: 0 | Refills: 0 | Status: DISCONTINUED | OUTPATIENT
Start: 2019-04-21 | End: 2019-04-23

## 2019-04-21 RX ORDER — IBUPROFEN 200 MG
600 TABLET ORAL EVERY 6 HOURS
Qty: 0 | Refills: 0 | Status: DISCONTINUED | OUTPATIENT
Start: 2019-04-21 | End: 2019-04-23

## 2019-04-21 RX ORDER — SIMETHICONE 80 MG/1
80 TABLET, CHEWABLE ORAL EVERY 4 HOURS
Qty: 0 | Refills: 0 | Status: DISCONTINUED | OUTPATIENT
Start: 2019-04-21 | End: 2019-04-23

## 2019-04-21 RX ADMIN — Medication 975 MILLIGRAM(S): at 10:55

## 2019-04-21 RX ADMIN — HEPARIN SODIUM 5000 UNIT(S): 5000 INJECTION INTRAVENOUS; SUBCUTANEOUS at 15:17

## 2019-04-21 RX ADMIN — Medication 600 MILLIGRAM(S): at 21:15

## 2019-04-21 RX ADMIN — Medication 375 MILLIUNIT(S)/MIN: at 01:13

## 2019-04-21 RX ADMIN — Medication 975 MILLIGRAM(S): at 04:00

## 2019-04-21 RX ADMIN — Medication 975 MILLIGRAM(S): at 21:16

## 2019-04-21 RX ADMIN — Medication 975 MILLIGRAM(S): at 09:59

## 2019-04-21 RX ADMIN — Medication 600 MILLIGRAM(S): at 16:15

## 2019-04-21 RX ADMIN — Medication 600 MILLIGRAM(S): at 22:00

## 2019-04-21 RX ADMIN — Medication 975 MILLIGRAM(S): at 16:15

## 2019-04-21 RX ADMIN — Medication 1 TABLET(S): at 12:17

## 2019-04-21 RX ADMIN — Medication 975 MILLIGRAM(S): at 15:17

## 2019-04-21 RX ADMIN — Medication 325 MILLIGRAM(S): at 12:17

## 2019-04-21 RX ADMIN — Medication 600 MILLIGRAM(S): at 09:55

## 2019-04-21 RX ADMIN — HEPARIN SODIUM 5000 UNIT(S): 5000 INJECTION INTRAVENOUS; SUBCUTANEOUS at 03:35

## 2019-04-21 RX ADMIN — Medication 600 MILLIGRAM(S): at 10:00

## 2019-04-21 RX ADMIN — Medication 600 MILLIGRAM(S): at 15:17

## 2019-04-21 RX ADMIN — Medication 975 MILLIGRAM(S): at 22:00

## 2019-04-21 RX ADMIN — Medication 975 MILLIGRAM(S): at 03:35

## 2019-04-21 NOTE — PROGRESS NOTE ADULT - SUBJECTIVE AND OBJECTIVE BOX
OB Progress Note:  Delivery, POD#1    S: 29yo POD#1 s/p LTCS . Her pain is well controlled. She is tolerating a regular diet but not yet passing flatus. Denies N/V. Denies CP/SOB/lightheadedness/dizziness. Stacy in place. Note yet OOB.    O:   Vital Signs Last 24 Hrs  T(C): 36.7 (2019 01:51), Max: 36.7 (2019 01:51)  T(F): 98.1 (2019 01:51), Max: 98.1 (2019 01:51)  HR: 78 (2019 01:51) (64 - 93)  BP: 114/56 (2019 01:51) (68/34 - 122/62)  BP(mean): 73 (2019 01:00) (40 - 87)  RR: 18 (2019 01:51) (14 - 23)  SpO2: 97% (2019 01:51) (96% - 100%)    Labs:  Blood type: O Positive  Rubella IgG: RPR: Negative                          13.0   8.39 >-----------< 212    ( 04-20 @ 10:30 )             40.6                  acetaminophen   Tablet .. 975 milliGRAM(s) Oral every 6 hours  diphenhydrAMINE 25 milliGRAM(s) Oral every 6 hours PRN  diphtheria/tetanus/pertussis (acellular) Vaccine (ADAcel) 0.5 milliLiter(s) IntraMuscular once  docusate sodium 100 milliGRAM(s) Oral two times a day PRN  ferrous    sulfate 325 milliGRAM(s) Oral daily  glycerin Suppository - Adult 1 Suppository(s) Rectal at bedtime PRN  heparin  Injectable 5000 Unit(s) SubCutaneous every 12 hours  HYDROmorphone  Injectable 1 milliGRAM(s) IV Push every 3 hours PRN  ibuprofen  Tablet. 600 milliGRAM(s) Oral every 6 hours  lactated ringers. 1000 milliLiter(s) IV Continuous <Continuous>  lactated ringers. 1000 milliLiter(s) IV Continuous <Continuous>  lanolin Ointment 1 Application(s) Topical every 3 hours PRN  morphine PF Spinal 0.2 milliGRAM(s) IntraThecal. once  nalbuphine Injectable 5 milliGRAM(s) IV Push every 6 hours PRN  naloxone Injectable 0.1 milliGRAM(s) IV Push every 3 minutes PRN  ondansetron Injectable 4 milliGRAM(s) IV Push every 6 hours PRN  oxyCODONE    IR 5 milliGRAM(s) Oral every 3 hours PRN  oxyCODONE    IR 10 milliGRAM(s) Oral every 3 hours PRN  oxyCODONE    IR 5 milliGRAM(s) Oral every 3 hours  oxyCODONE    IR 5 milliGRAM(s) Oral every 4 hours PRN  oxytocin Infusion 125 milliUNIT(s)/Min IV Continuous <Continuous>  prenatal multivitamin 1 Tablet(s) Oral daily  simethicone 80 milliGRAM(s) Chew every 4 hours PRN      PE:  General: NAD  Abdomen: Mildly distended, appropriately tender, incision c/d/i.  Extremities: No erythema, no pitting edema

## 2019-04-21 NOTE — PROGRESS NOTE ADULT - SUBJECTIVE AND OBJECTIVE BOX
Postpartum Note,  Section  She is a  28y woman who is now post-operative day: 1    Subjective:  The patient feels well.  She is ambulating.   She is tolerating regular diet.  She denies nausea and vomiting.  She is voiding.  Her pain is controlled.  She reports normal postpartum bleeding.        Physical exam:    Vital Signs Last 24 Hrs  T(C): 36.8 (2019 06:00), Max: 36.8 (2019 06:00)  T(F): 98.2 (2019 06:00), Max: 98.2 (2019 06:00)  HR: 81 (2019 06:00) (64 - 93)  BP: 112/60 (2019 06:00) (68/34 - 122/62)  BP(mean): 73 (2019 01:00) (40 - 87)  RR: 17 (2019 06:00) (14 - 23)  SpO2: 100% (2019 06:00) (96% - 100%)    Gen: NAD  Breast: Soft, nontender, not engorged.  Abdomen: Soft, nontender, no distension , firm uterine fundus at umbilicus.  Incision: Clean, dry, and intact with steri strips  Pelvic: Normal lochia noted  Ext: No calf tenderness    LABS:                        11.6   13.33 )-----------( 170      ( 2019 06:45 )             36.1                         13.0   8.39  )-----------( 212      ( 2019 10:30 )             40.6     ABO Interpretation: O ( @ 10:18)  Rh Interpretation: Positive ( @ 10:18)  Antibody Screen: Negative ( @ 10:18)                Allergies    No Known Allergies    Intolerances      MEDICATIONS  (STANDING):  acetaminophen   Tablet .. 975 milliGRAM(s) Oral every 6 hours  diphtheria/tetanus/pertussis (acellular) Vaccine (ADAcel) 0.5 milliLiter(s) IntraMuscular once  ferrous    sulfate 325 milliGRAM(s) Oral daily  heparin  Injectable 5000 Unit(s) SubCutaneous every 12 hours  ibuprofen  Tablet. 600 milliGRAM(s) Oral every 6 hours  morphine PF Spinal 0.2 milliGRAM(s) IntraThecal. once  oxyCODONE    IR 5 milliGRAM(s) Oral every 3 hours  oxytocin Infusion 125 milliUNIT(s)/Min (375 mL/Hr) IV Continuous <Continuous>  prenatal multivitamin 1 Tablet(s) Oral daily    MEDICATIONS  (PRN):  diphenhydrAMINE 25 milliGRAM(s) Oral every 6 hours PRN Itching  docusate sodium 100 milliGRAM(s) Oral two times a day PRN Stool Softening  glycerin Suppository - Adult 1 Suppository(s) Rectal at bedtime PRN Constipation  HYDROmorphone  Injectable 1 milliGRAM(s) IV Push every 3 hours PRN Severe Pain (7 - 10)  lanolin Ointment 1 Application(s) Topical every 3 hours PRN Sore Nipples  nalbuphine Injectable 5 milliGRAM(s) IV Push every 6 hours PRN Pruritus  naloxone Injectable 0.1 milliGRAM(s) IV Push every 3 minutes PRN For ANY of the following changes in patient status:  A. RR LESS THAN 10 breaths per minute, B. Oxygen saturation LESS THAN 90%, C. Sedation score of 6  ondansetron Injectable 4 milliGRAM(s) IV Push every 6 hours PRN Nausea  oxyCODONE    IR 5 milliGRAM(s) Oral every 3 hours PRN Mild Pain (1 - 3)  oxyCODONE    IR 10 milliGRAM(s) Oral every 3 hours PRN Moderate Pain (4 - 6)  oxyCODONE    IR 5 milliGRAM(s) Oral every 4 hours PRN Severe Pain (7 - 10)  simethicone 80 milliGRAM(s) Chew every 4 hours PRN Gas        Assessment and Plan  POD #1 s/p  section  Doing well.  Encourage ambulation.

## 2019-04-21 NOTE — PROGRESS NOTE ADULT - PROBLEM SELECTOR PLAN 1
- Continue regular diet.  - Increase ambulation.  - Continue motrin, tylenol, oxycodone PRN for pain control  - F/u AM CBC    Lilia Garcia, PGY1  Pager #[phone]

## 2019-04-21 NOTE — LACTATION INITIAL EVALUATION - LACTATION INTERVENTIONS
Infant less than 24 hours old.  Sleepy. Mom reassured that babies less than 24 hours of age will be sleepy. Instructed mother in use of breastfeeding log to document feeds along with wet and dirty diapers. Pt. informed of the accessibility of breastfeeding apps to document feedings along with wet and dirty diapers as another alternative for paper log. Instructed in hand expression with good return demonstration.  Pt. informed of availability of lactation through the day and encouraged to call for assistance prn.  RN made aware of plan and to assist with further feedings as necessary. Instructed to request assistance prn.

## 2019-04-21 NOTE — PROGRESS NOTE ADULT - SUBJECTIVE AND OBJECTIVE BOX
Postop Day  __1_ s/p   C- Section    THERAPY:  [ x ] Spinal morphine   [  ] Epidural morphine   [  ] IV PCA Hydromorphone 1 mg/ml      Sedation Score:	  [x  ] Alert	    [  ] Drowsy        [  ] Arousable	[  ] Asleep	[  ] Unresponsive    Side Effects:	  [ x ] None	     [  ] Nausea        [  ] Pruritus        [  ] Weakness   [  ] Numbness        ASSESSMENT/ PLAN   [   ] Discontinue         [  ] Continue  [ x ]Documentation and Verification of current medications     Comments:

## 2019-04-22 RX ORDER — ACETAMINOPHEN 500 MG
3 TABLET ORAL
Qty: 0 | Refills: 0 | COMMUNITY
Start: 2019-04-22

## 2019-04-22 RX ORDER — IBUPROFEN 200 MG
1 TABLET ORAL
Qty: 0 | Refills: 0 | COMMUNITY
Start: 2019-04-22

## 2019-04-22 RX ORDER — LEVOTHYROXINE SODIUM 125 MCG
1 TABLET ORAL
Qty: 0 | Refills: 0 | COMMUNITY
Start: 2019-04-22

## 2019-04-22 RX ADMIN — Medication 975 MILLIGRAM(S): at 03:22

## 2019-04-22 RX ADMIN — Medication 975 MILLIGRAM(S): at 20:47

## 2019-04-22 RX ADMIN — Medication 600 MILLIGRAM(S): at 18:12

## 2019-04-22 RX ADMIN — Medication 600 MILLIGRAM(S): at 20:20

## 2019-04-22 RX ADMIN — Medication 600 MILLIGRAM(S): at 11:00

## 2019-04-22 RX ADMIN — Medication 600 MILLIGRAM(S): at 03:22

## 2019-04-22 RX ADMIN — Medication 600 MILLIGRAM(S): at 04:30

## 2019-04-22 RX ADMIN — Medication 600 MILLIGRAM(S): at 12:00

## 2019-04-22 RX ADMIN — Medication 975 MILLIGRAM(S): at 20:20

## 2019-04-22 RX ADMIN — HEPARIN SODIUM 5000 UNIT(S): 5000 INJECTION INTRAVENOUS; SUBCUTANEOUS at 18:12

## 2019-04-22 RX ADMIN — Medication 1 APPLICATION(S): at 03:21

## 2019-04-22 RX ADMIN — Medication 975 MILLIGRAM(S): at 04:30

## 2019-04-22 RX ADMIN — Medication 600 MILLIGRAM(S): at 20:48

## 2019-04-22 RX ADMIN — Medication 975 MILLIGRAM(S): at 12:00

## 2019-04-22 RX ADMIN — Medication 975 MILLIGRAM(S): at 16:20

## 2019-04-22 RX ADMIN — Medication 975 MILLIGRAM(S): at 18:12

## 2019-04-22 RX ADMIN — Medication 600 MILLIGRAM(S): at 16:20

## 2019-04-22 RX ADMIN — Medication 200 MICROGRAM(S): at 05:41

## 2019-04-22 RX ADMIN — Medication 975 MILLIGRAM(S): at 11:00

## 2019-04-22 RX ADMIN — Medication 325 MILLIGRAM(S): at 16:03

## 2019-04-22 RX ADMIN — HEPARIN SODIUM 5000 UNIT(S): 5000 INJECTION INTRAVENOUS; SUBCUTANEOUS at 05:03

## 2019-04-22 NOTE — PROGRESS NOTE ADULT - SUBJECTIVE AND OBJECTIVE BOX
She is a  28y woman who is now post-operative day: 2    Subjective:  Pt without complaints.  Pain contolled.  +ambulating.  +void.    Tolerating regular diet.  No nausea/vomiting. Lochia WNL.    Objective:  Vital Signs Last 24 Hrs  T(C): 36.5 (2019 07:53), Max: 37.1 (2019 13:42)  T(F): 97.7 (2019 07:53), Max: 98.8 (2019 13:42)  HR: 78 (2019 07:53) (78 - 87)  BP: 99/61 (2019 07:53) (97/54 - 113/65)  BP(mean): --  RR: 20 (2019 07:53) (18 - 20)  SpO2: 100% (2019 07:53) (99% - 100%)    Constitutional: NAD  Breast: Soft, nontender, not engorged.  Abdomen: Soft, nontender, no distension.  Uterine fundus firm, non-tender.  Incision: Clean, dry, and intact  Ext: No calf tenderness bilaterally    LABS:                        11.6   13.33 )-----------( 170      ( 2019 06:45 )             36.1                         13.0   8.39  )-----------( 212      ( 2019 10:30 )             40.6         Allergies    No Known Allergies    Intolerances      MEDICATIONS  (STANDING):  acetaminophen   Tablet .. 975 milliGRAM(s) Oral every 6 hours  diphtheria/tetanus/pertussis (acellular) Vaccine (ADAcel) 0.5 milliLiter(s) IntraMuscular once  ferrous    sulfate 325 milliGRAM(s) Oral daily  heparin  Injectable 5000 Unit(s) SubCutaneous every 12 hours  ibuprofen  Tablet. 600 milliGRAM(s) Oral every 6 hours  levothyroxine 200 MICROGram(s) Oral daily  oxyCODONE    IR 5 milliGRAM(s) Oral every 3 hours  prenatal multivitamin 1 Tablet(s) Oral daily    MEDICATIONS  (PRN):  diphenhydrAMINE 25 milliGRAM(s) Oral every 6 hours PRN Itching  docusate sodium 100 milliGRAM(s) Oral two times a day PRN Stool Softening  glycerin Suppository - Adult 1 Suppository(s) Rectal at bedtime PRN Constipation  lanolin Ointment 1 Application(s) Topical every 3 hours PRN Sore Nipples  oxyCODONE    IR 5 milliGRAM(s) Oral every 4 hours PRN Severe Pain (7 - 10)  simethicone 80 milliGRAM(s) Chew every 4 hours PRN Gas        Assessment and Plan  Pt stable POD #2 s/p  section  -continue routine postoperative and postpartum care  -encourage ambulation  -analgesia prn  -discharge planned for tomorrow

## 2019-04-23 VITALS
HEART RATE: 86 BPM | RESPIRATION RATE: 17 BRPM | DIASTOLIC BLOOD PRESSURE: 55 MMHG | SYSTOLIC BLOOD PRESSURE: 109 MMHG | TEMPERATURE: 98 F | OXYGEN SATURATION: 100 %

## 2019-04-23 VITALS
TEMPERATURE: 99 F | HEART RATE: 117 BPM | DIASTOLIC BLOOD PRESSURE: 71 MMHG | SYSTOLIC BLOOD PRESSURE: 127 MMHG | RESPIRATION RATE: 17 BRPM | OXYGEN SATURATION: 99 %

## 2019-04-23 LAB
ALBUMIN SERPL ELPH-MCNC: 3 G/DL — LOW (ref 3.3–5)
ALP SERPL-CCNC: 115 U/L — SIGNIFICANT CHANGE UP (ref 40–120)
ALT FLD-CCNC: 14 U/L — SIGNIFICANT CHANGE UP (ref 4–33)
ANION GAP SERPL CALC-SCNC: 14 MMO/L — SIGNIFICANT CHANGE UP (ref 7–14)
APPEARANCE UR: SIGNIFICANT CHANGE UP
AST SERPL-CCNC: 14 U/L — SIGNIFICANT CHANGE UP (ref 4–32)
BACTERIA # UR AUTO: SIGNIFICANT CHANGE UP
BASE EXCESS BLDV CALC-SCNC: -0.1 MMOL/L — SIGNIFICANT CHANGE UP
BASOPHILS # BLD AUTO: 0.05 K/UL — SIGNIFICANT CHANGE UP (ref 0–0.2)
BASOPHILS NFR BLD AUTO: 0.2 % — SIGNIFICANT CHANGE UP (ref 0–2)
BILIRUB SERPL-MCNC: 0.3 MG/DL — SIGNIFICANT CHANGE UP (ref 0.2–1.2)
BILIRUB UR-MCNC: NEGATIVE — SIGNIFICANT CHANGE UP
BLOOD GAS VENOUS - CREATININE: SIGNIFICANT CHANGE UP MG/DL (ref 0.5–1.3)
BLOOD UR QL VISUAL: HIGH
BUN SERPL-MCNC: 7 MG/DL — SIGNIFICANT CHANGE UP (ref 7–23)
CALCIUM SERPL-MCNC: 8.5 MG/DL — SIGNIFICANT CHANGE UP (ref 8.4–10.5)
CHLORIDE BLDV-SCNC: 108 MMOL/L — SIGNIFICANT CHANGE UP (ref 96–108)
CHLORIDE SERPL-SCNC: 102 MMOL/L — SIGNIFICANT CHANGE UP (ref 98–107)
CO2 SERPL-SCNC: 23 MMOL/L — SIGNIFICANT CHANGE UP (ref 22–31)
COLOR SPEC: SIGNIFICANT CHANGE UP
CREAT SERPL-MCNC: 0.65 MG/DL — SIGNIFICANT CHANGE UP (ref 0.5–1.3)
EOSINOPHIL # BLD AUTO: 0.35 K/UL — SIGNIFICANT CHANGE UP (ref 0–0.5)
EOSINOPHIL NFR BLD AUTO: 1.4 % — SIGNIFICANT CHANGE UP (ref 0–6)
GAS PNL BLDV: 135 MMOL/L — LOW (ref 136–146)
GLUCOSE BLDV-MCNC: 80 — SIGNIFICANT CHANGE UP (ref 70–99)
GLUCOSE SERPL-MCNC: 76 MG/DL — SIGNIFICANT CHANGE UP (ref 70–99)
GLUCOSE UR-MCNC: NEGATIVE — SIGNIFICANT CHANGE UP
HCO3 BLDV-SCNC: 24 MMOL/L — SIGNIFICANT CHANGE UP (ref 20–27)
HCT VFR BLD CALC: 37.5 % — SIGNIFICANT CHANGE UP (ref 34.5–45)
HCT VFR BLDV CALC: 37.4 % — SIGNIFICANT CHANGE UP (ref 34.5–45)
HGB BLD-MCNC: 11.8 G/DL — SIGNIFICANT CHANGE UP (ref 11.5–15.5)
HGB BLDV-MCNC: 12.1 G/DL — SIGNIFICANT CHANGE UP (ref 11.5–15.5)
HYALINE CASTS # UR AUTO: NEGATIVE — SIGNIFICANT CHANGE UP
IMM GRANULOCYTES NFR BLD AUTO: 2.2 % — HIGH (ref 0–1.5)
KETONES UR-MCNC: NEGATIVE — SIGNIFICANT CHANGE UP
LACTATE BLDV-MCNC: 1 MMOL/L — SIGNIFICANT CHANGE UP (ref 0.5–2)
LEUKOCYTE ESTERASE UR-ACNC: SIGNIFICANT CHANGE UP
LYMPHOCYTES # BLD AUTO: 1.75 K/UL — SIGNIFICANT CHANGE UP (ref 1–3.3)
LYMPHOCYTES # BLD AUTO: 7.2 % — LOW (ref 13–44)
MCHC RBC-ENTMCNC: 28.1 PG — SIGNIFICANT CHANGE UP (ref 27–34)
MCHC RBC-ENTMCNC: 31.5 % — LOW (ref 32–36)
MCV RBC AUTO: 89.3 FL — SIGNIFICANT CHANGE UP (ref 80–100)
MONOCYTES # BLD AUTO: 2.34 K/UL — HIGH (ref 0–0.9)
MONOCYTES NFR BLD AUTO: 9.7 % — SIGNIFICANT CHANGE UP (ref 2–14)
NEUTROPHILS # BLD AUTO: 19.15 K/UL — HIGH (ref 1.8–7.4)
NEUTROPHILS NFR BLD AUTO: 79.3 % — HIGH (ref 43–77)
NITRITE UR-MCNC: NEGATIVE — SIGNIFICANT CHANGE UP
NRBC # FLD: 0 K/UL — SIGNIFICANT CHANGE UP (ref 0–0)
PCO2 BLDV: 41 MMHG — SIGNIFICANT CHANGE UP (ref 41–51)
PH BLDV: 7.39 PH — SIGNIFICANT CHANGE UP (ref 7.32–7.43)
PH UR: 6 — SIGNIFICANT CHANGE UP (ref 5–8)
PLATELET # BLD AUTO: 219 K/UL — SIGNIFICANT CHANGE UP (ref 150–400)
PMV BLD: 10.6 FL — SIGNIFICANT CHANGE UP (ref 7–13)
PO2 BLDV: 34 MMHG — LOW (ref 35–40)
POTASSIUM BLDV-SCNC: 3.4 MMOL/L — SIGNIFICANT CHANGE UP (ref 3.4–4.5)
POTASSIUM SERPL-MCNC: 3.7 MMOL/L — SIGNIFICANT CHANGE UP (ref 3.5–5.3)
POTASSIUM SERPL-SCNC: 3.7 MMOL/L — SIGNIFICANT CHANGE UP (ref 3.5–5.3)
PROT SERPL-MCNC: 6.5 G/DL — SIGNIFICANT CHANGE UP (ref 6–8.3)
PROT UR-MCNC: 30 — SIGNIFICANT CHANGE UP
RBC # BLD: 4.2 M/UL — SIGNIFICANT CHANGE UP (ref 3.8–5.2)
RBC # FLD: 14.6 % — HIGH (ref 10.3–14.5)
RBC CASTS # UR COMP ASSIST: >50 — HIGH (ref 0–?)
SAO2 % BLDV: 57 % — LOW (ref 60–85)
SODIUM SERPL-SCNC: 139 MMOL/L — SIGNIFICANT CHANGE UP (ref 135–145)
SP GR SPEC: 1.01 — SIGNIFICANT CHANGE UP (ref 1–1.04)
SQUAMOUS # UR AUTO: SIGNIFICANT CHANGE UP
UROBILINOGEN FLD QL: NORMAL — SIGNIFICANT CHANGE UP
WBC # BLD: 24.16 K/UL — HIGH (ref 3.8–10.5)
WBC # FLD AUTO: 24.16 K/UL — HIGH (ref 3.8–10.5)
WBC UR QL: >50 — HIGH (ref 0–?)

## 2019-04-23 PROCEDURE — 76856 US EXAM PELVIC COMPLETE: CPT | Mod: 26

## 2019-04-23 RX ORDER — ACETAMINOPHEN 500 MG
975 TABLET ORAL ONCE
Qty: 0 | Refills: 0 | Status: COMPLETED | OUTPATIENT
Start: 2019-04-23 | End: 2019-04-23

## 2019-04-23 RX ORDER — SODIUM CHLORIDE 9 MG/ML
1000 INJECTION INTRAMUSCULAR; INTRAVENOUS; SUBCUTANEOUS ONCE
Qty: 0 | Refills: 0 | Status: COMPLETED | OUTPATIENT
Start: 2019-04-23 | End: 2019-04-23

## 2019-04-23 RX ADMIN — Medication 975 MILLIGRAM(S): at 03:02

## 2019-04-23 RX ADMIN — Medication 600 MILLIGRAM(S): at 03:02

## 2019-04-23 RX ADMIN — Medication 600 MILLIGRAM(S): at 02:27

## 2019-04-23 RX ADMIN — Medication 975 MILLIGRAM(S): at 08:28

## 2019-04-23 RX ADMIN — Medication 100 MILLIGRAM(S): at 23:30

## 2019-04-23 RX ADMIN — Medication 200 MICROGRAM(S): at 06:22

## 2019-04-23 RX ADMIN — Medication 600 MILLIGRAM(S): at 08:28

## 2019-04-23 RX ADMIN — HEPARIN SODIUM 5000 UNIT(S): 5000 INJECTION INTRAVENOUS; SUBCUTANEOUS at 05:59

## 2019-04-23 RX ADMIN — SIMETHICONE 80 MILLIGRAM(S): 80 TABLET, CHEWABLE ORAL at 08:28

## 2019-04-23 RX ADMIN — Medication 975 MILLIGRAM(S): at 09:00

## 2019-04-23 RX ADMIN — SODIUM CHLORIDE 1000 MILLILITER(S): 9 INJECTION INTRAMUSCULAR; INTRAVENOUS; SUBCUTANEOUS at 22:20

## 2019-04-23 RX ADMIN — Medication 975 MILLIGRAM(S): at 02:25

## 2019-04-23 RX ADMIN — Medication 600 MILLIGRAM(S): at 09:00

## 2019-04-23 NOTE — ED ADULT NURSE NOTE - CHIEF COMPLAINT QUOTE
A&Ox3 c/o fever that began today, pt s/p  on  and was d/c from [facility] this morning, pt states when she arrived home she felt ache and noted her temperature to be 100.7 F pt took Motrin this pt called OB Md Sravani Laird and was advised to come to ED for evaluation, spoke to L&D agatha atkinson,

## 2019-04-23 NOTE — PROGRESS NOTE ADULT - REASON FOR ADMISSION
scheduled repeat C section
delivery

## 2019-04-23 NOTE — ED PROVIDER NOTE - OBJECTIVE STATEMENT
27 y/o female pmh graves disease c/o fever x today. Pt is POD #3 after . Pt was dc today but hwen she got home developed a fever to 100. Pt admits to feeling achy and having some dysuria and dry cough. Pt admits to similar situation x2 years ago after  and ended having an infected seroma. Pt admits to mild abd pain but typical after . Pt denies chest pain, sob, n/v/d, vaginal discharge, numbness, tingling, weakness, dizizness, syncope, or chills.

## 2019-04-23 NOTE — PROGRESS NOTE ADULT - SUBJECTIVE AND OBJECTIVE BOX
Postpartum Note,  Section  She is a  28y woman who is now post-operative day:3     Subjective:  The patient feels well.  She is ambulating.   She is tolerating regular diet.  She denies nausea and vomiting.  She is voiding.  Her pain is controlled.  She reports normal postpartum bleeding    Physical exam:    Vital Signs Last 24 Hrs  T(C): 36.9 (2019 22:53), Max: 37.2 (2019 19:40)  T(F): 98.5 (2019 22:53), Max: 99 (2019 19:40)  HR: 89 (2019 22:53) (76 - 92)  BP: 104/53 (2019 22:53) (99/61 - 115/62)  BP(mean): --  RR: 18 (2019 22:53) (18 - 20)  SpO2: 98% (2019 22:53) (98% - 100%)    Gen: NAD  Breast: Soft, nontender, not engorged.  Abdomen: Soft, nontender, no distension , firm uterine fundus at umbilicus.  Incision: Clean, dry, and intact with steri strips  Pelvic: Normal lochia noted  Ext: No calf tenderness    LABS:                        11.6   13.33 )-----------( 170      ( 2019 06:45 )             36.1                   Allergies    No Known Allergies    Intolerances      MEDICATIONS  (STANDING):  acetaminophen   Tablet .. 975 milliGRAM(s) Oral every 6 hours  diphtheria/tetanus/pertussis (acellular) Vaccine (ADAcel) 0.5 milliLiter(s) IntraMuscular once  ferrous    sulfate 325 milliGRAM(s) Oral daily  heparin  Injectable 5000 Unit(s) SubCutaneous every 12 hours  ibuprofen  Tablet. 600 milliGRAM(s) Oral every 6 hours  levothyroxine 200 MICROGram(s) Oral daily  oxyCODONE    IR 5 milliGRAM(s) Oral every 3 hours  prenatal multivitamin 1 Tablet(s) Oral daily    MEDICATIONS  (PRN):  diphenhydrAMINE 25 milliGRAM(s) Oral every 6 hours PRN Itching  docusate sodium 100 milliGRAM(s) Oral two times a day PRN Stool Softening  glycerin Suppository - Adult 1 Suppository(s) Rectal at bedtime PRN Constipation  lanolin Ointment 1 Application(s) Topical every 3 hours PRN Sore Nipples  oxyCODONE    IR 5 milliGRAM(s) Oral every 4 hours PRN Severe Pain (7 - 10)  simethicone 80 milliGRAM(s) Chew every 4 hours PRN Gas        Assessment and Plan  POD #3  s/p  section  Doing well.  Encourage ambulation.  D/C home with instructions

## 2019-04-23 NOTE — ED ADULT NURSE NOTE - OBJECTIVE STATEMENT
Break coverage RN: PT brought into room 17. a&ox4 ambulatory self care female presents to the ed today for fever. Patient had csection on Saturday, patient discharged today and was told to come back if she developed a fever. Patient states Break coverage RN: PT brought into room 17. a&ox4 ambulatory self care female presents to the ed today for fever. Patient had csection on Saturday, patient discharged today and was told to come back if she developed a fever. Patient states she felt warm and weak while being discharged today but was afebrile due to motrin and tylenol administration. PT states her max temp was 100.7.  PT states on her first pregnancy this occurred and she was admitted for 10 days. PT appears tearful. PT states she is in pain from the incision site, no signs of infection incision site is clean, dry and intact.

## 2019-04-23 NOTE — ED ADULT TRIAGE NOTE - CHIEF COMPLAINT QUOTE
A&Ox3 c/o fever that began today, pt s/p  on  and was d/c from [facility] this morning, pt states when she arrived home she felt ache and noted her temperature to be 100.7 F pt took Motrin this pt called OB Md Sravani Laird and was advised to come to ED for evaluation, spoke to L&D agatha atkinson,

## 2019-04-23 NOTE — ED PROVIDER NOTE - ATTENDING CONTRIBUTION TO CARE
I was physically present for the E/M service provided. I agree with above history, physical, and plan which I have reviewed and edited where appropriate. I was physically present for the key portions of the service provided.     Attending Exam - Dr. Zuñiga: GEN: well appearing, NAD  HEENT: +PERRL, EOMI  RESP: CTAB, no signs of respiratory distress CV: s1s2 RRR ABD: soft/non tender/non distended  MSK: no deformities / swelling, normal range of motion, spine grossly normal NEURO: alert, non focal exam SKIN: normal color / temperature / condition.  site- clean, dry, intact, no erythema, no drianage. minimal tenderness.

## 2019-04-24 ENCOUNTER — INPATIENT (INPATIENT)
Facility: HOSPITAL | Age: 29
LOS: 5 days | Discharge: HOME HEALTH | End: 2019-04-30
Attending: SPECIALIST | Admitting: SPECIALIST
Payer: MEDICAID

## 2019-04-24 DIAGNOSIS — Z98.891 HISTORY OF UTERINE SCAR FROM PREVIOUS SURGERY: Chronic | ICD-10-CM

## 2019-04-24 DIAGNOSIS — Z98.890 OTHER SPECIFIED POSTPROCEDURAL STATES: Chronic | ICD-10-CM

## 2019-04-24 DIAGNOSIS — R50.9 FEVER, UNSPECIFIED: ICD-10-CM

## 2019-04-24 DIAGNOSIS — Z90.5 ACQUIRED ABSENCE OF KIDNEY: Chronic | ICD-10-CM

## 2019-04-24 LAB
APTT BLD: 31.6 SEC — SIGNIFICANT CHANGE UP (ref 27.5–36.3)
BASOPHILS # BLD AUTO: 0.04 K/UL — SIGNIFICANT CHANGE UP (ref 0–0.2)
BASOPHILS NFR BLD AUTO: 0.2 % — SIGNIFICANT CHANGE UP (ref 0–2)
EOSINOPHIL # BLD AUTO: 0.19 K/UL — SIGNIFICANT CHANGE UP (ref 0–0.5)
EOSINOPHIL NFR BLD AUTO: 0.8 % — SIGNIFICANT CHANGE UP (ref 0–6)
HCT VFR BLD CALC: 37.3 % — SIGNIFICANT CHANGE UP (ref 34.5–45)
HGB BLD-MCNC: 11.6 G/DL — SIGNIFICANT CHANGE UP (ref 11.5–15.5)
IMM GRANULOCYTES NFR BLD AUTO: 1.3 % — SIGNIFICANT CHANGE UP (ref 0–1.5)
INR BLD: 1.04 — SIGNIFICANT CHANGE UP (ref 0.88–1.17)
LYMPHOCYTES # BLD AUTO: 1.54 K/UL — SIGNIFICANT CHANGE UP (ref 1–3.3)
LYMPHOCYTES # BLD AUTO: 6.8 % — LOW (ref 13–44)
MCHC RBC-ENTMCNC: 28 PG — SIGNIFICANT CHANGE UP (ref 27–34)
MCHC RBC-ENTMCNC: 31.1 % — LOW (ref 32–36)
MCV RBC AUTO: 89.9 FL — SIGNIFICANT CHANGE UP (ref 80–100)
MONOCYTES # BLD AUTO: 2.11 K/UL — HIGH (ref 0–0.9)
MONOCYTES NFR BLD AUTO: 9.3 % — SIGNIFICANT CHANGE UP (ref 2–14)
NEUTROPHILS # BLD AUTO: 18.5 K/UL — HIGH (ref 1.8–7.4)
NEUTROPHILS NFR BLD AUTO: 81.6 % — HIGH (ref 43–77)
NRBC # FLD: 0 K/UL — SIGNIFICANT CHANGE UP (ref 0–0)
PLATELET # BLD AUTO: 246 K/UL — SIGNIFICANT CHANGE UP (ref 150–400)
PMV BLD: 10.5 FL — SIGNIFICANT CHANGE UP (ref 7–13)
PROTHROM AB SERPL-ACNC: 11.9 SEC — SIGNIFICANT CHANGE UP (ref 9.8–13.1)
RBC # BLD: 4.15 M/UL — SIGNIFICANT CHANGE UP (ref 3.8–5.2)
RBC # FLD: 14.6 % — HIGH (ref 10.3–14.5)
SPECIMEN SOURCE: SIGNIFICANT CHANGE UP
SPECIMEN SOURCE: SIGNIFICANT CHANGE UP
WBC # BLD: 22.67 K/UL — HIGH (ref 3.8–10.5)
WBC # FLD AUTO: 22.67 K/UL — HIGH (ref 3.8–10.5)

## 2019-04-24 PROCEDURE — 74177 CT ABD & PELVIS W/CONTRAST: CPT | Mod: 26

## 2019-04-24 PROCEDURE — 71046 X-RAY EXAM CHEST 2 VIEWS: CPT | Mod: 26

## 2019-04-24 RX ORDER — OXYCODONE HYDROCHLORIDE 5 MG/1
5 TABLET ORAL EVERY 6 HOURS
Qty: 0 | Refills: 0 | Status: DISCONTINUED | OUTPATIENT
Start: 2019-04-24 | End: 2019-04-30

## 2019-04-24 RX ORDER — IBUPROFEN 200 MG
600 TABLET ORAL EVERY 6 HOURS
Qty: 0 | Refills: 0 | Status: DISCONTINUED | OUTPATIENT
Start: 2019-04-24 | End: 2019-04-24

## 2019-04-24 RX ORDER — SODIUM CHLORIDE 9 MG/ML
1000 INJECTION INTRAMUSCULAR; INTRAVENOUS; SUBCUTANEOUS
Qty: 0 | Refills: 0 | Status: DISCONTINUED | OUTPATIENT
Start: 2019-04-24 | End: 2019-04-30

## 2019-04-24 RX ORDER — LEVOTHYROXINE SODIUM 125 MCG
200 TABLET ORAL DAILY
Qty: 0 | Refills: 0 | Status: DISCONTINUED | OUTPATIENT
Start: 2019-04-24 | End: 2019-04-30

## 2019-04-24 RX ORDER — FOLIC ACID 0.8 MG
1 TABLET ORAL DAILY
Qty: 0 | Refills: 0 | Status: DISCONTINUED | OUTPATIENT
Start: 2019-04-24 | End: 2019-04-30

## 2019-04-24 RX ORDER — PIPERACILLIN AND TAZOBACTAM 4; .5 G/20ML; G/20ML
3.38 INJECTION, POWDER, LYOPHILIZED, FOR SOLUTION INTRAVENOUS EVERY 8 HOURS
Qty: 0 | Refills: 0 | Status: DISCONTINUED | OUTPATIENT
Start: 2019-04-24 | End: 2019-04-27

## 2019-04-24 RX ORDER — SODIUM CHLORIDE 9 MG/ML
1000 INJECTION INTRAMUSCULAR; INTRAVENOUS; SUBCUTANEOUS
Qty: 0 | Refills: 0 | Status: DISCONTINUED | OUTPATIENT
Start: 2019-04-24 | End: 2019-04-25

## 2019-04-24 RX ORDER — FERROUS SULFATE 325(65) MG
325 TABLET ORAL DAILY
Qty: 0 | Refills: 0 | Status: DISCONTINUED | OUTPATIENT
Start: 2019-04-24 | End: 2019-04-24

## 2019-04-24 RX ORDER — OXYCODONE HYDROCHLORIDE 5 MG/1
5 TABLET ORAL EVERY 4 HOURS
Qty: 0 | Refills: 0 | Status: DISCONTINUED | OUTPATIENT
Start: 2019-04-24 | End: 2019-04-24

## 2019-04-24 RX ORDER — HEPARIN SODIUM 5000 [USP'U]/ML
5000 INJECTION INTRAVENOUS; SUBCUTANEOUS EVERY 12 HOURS
Qty: 0 | Refills: 0 | Status: DISCONTINUED | OUTPATIENT
Start: 2019-04-24 | End: 2019-04-29

## 2019-04-24 RX ORDER — SIMETHICONE 80 MG/1
80 TABLET, CHEWABLE ORAL
Qty: 0 | Refills: 0 | Status: DISCONTINUED | OUTPATIENT
Start: 2019-04-24 | End: 2019-04-30

## 2019-04-24 RX ORDER — ACETAMINOPHEN 500 MG
975 TABLET ORAL EVERY 6 HOURS
Qty: 0 | Refills: 0 | Status: DISCONTINUED | OUTPATIENT
Start: 2019-04-24 | End: 2019-04-24

## 2019-04-24 RX ORDER — ACETAMINOPHEN 500 MG
975 TABLET ORAL EVERY 6 HOURS
Qty: 0 | Refills: 0 | Status: DISCONTINUED | OUTPATIENT
Start: 2019-04-24 | End: 2019-04-30

## 2019-04-24 RX ORDER — LEVOTHYROXINE SODIUM 125 MCG
200 TABLET ORAL DAILY
Qty: 0 | Refills: 0 | Status: DISCONTINUED | OUTPATIENT
Start: 2019-04-24 | End: 2019-04-24

## 2019-04-24 RX ORDER — HEPARIN SODIUM 5000 [USP'U]/ML
5000 INJECTION INTRAVENOUS; SUBCUTANEOUS EVERY 12 HOURS
Qty: 0 | Refills: 0 | Status: DISCONTINUED | OUTPATIENT
Start: 2019-04-24 | End: 2019-04-24

## 2019-04-24 RX ORDER — PIPERACILLIN AND TAZOBACTAM 4; .5 G/20ML; G/20ML
3.38 INJECTION, POWDER, LYOPHILIZED, FOR SOLUTION INTRAVENOUS EVERY 12 HOURS
Qty: 0 | Refills: 0 | Status: DISCONTINUED | OUTPATIENT
Start: 2019-04-24 | End: 2019-04-24

## 2019-04-24 RX ORDER — SODIUM CHLORIDE 9 MG/ML
1000 INJECTION, SOLUTION INTRAVENOUS
Qty: 0 | Refills: 0 | Status: DISCONTINUED | OUTPATIENT
Start: 2019-04-24 | End: 2019-04-24

## 2019-04-24 RX ORDER — FERROUS SULFATE 325(65) MG
325 TABLET ORAL DAILY
Qty: 0 | Refills: 0 | Status: DISCONTINUED | OUTPATIENT
Start: 2019-04-24 | End: 2019-04-30

## 2019-04-24 RX ORDER — IBUPROFEN 200 MG
600 TABLET ORAL EVERY 6 HOURS
Qty: 0 | Refills: 0 | Status: DISCONTINUED | OUTPATIENT
Start: 2019-04-24 | End: 2019-04-30

## 2019-04-24 RX ORDER — PIPERACILLIN AND TAZOBACTAM 4; .5 G/20ML; G/20ML
3.38 INJECTION, POWDER, LYOPHILIZED, FOR SOLUTION INTRAVENOUS EVERY 8 HOURS
Qty: 0 | Refills: 0 | Status: DISCONTINUED | OUTPATIENT
Start: 2019-04-24 | End: 2019-04-24

## 2019-04-24 RX ADMIN — Medication 600 MILLIGRAM(S): at 10:16

## 2019-04-24 RX ADMIN — PIPERACILLIN AND TAZOBACTAM 25 GRAM(S): 4; .5 INJECTION, POWDER, LYOPHILIZED, FOR SOLUTION INTRAVENOUS at 22:34

## 2019-04-24 RX ADMIN — PIPERACILLIN AND TAZOBACTAM 25 GRAM(S): 4; .5 INJECTION, POWDER, LYOPHILIZED, FOR SOLUTION INTRAVENOUS at 07:12

## 2019-04-24 RX ADMIN — Medication 1 MILLIGRAM(S): at 17:17

## 2019-04-24 RX ADMIN — Medication 600 MILLIGRAM(S): at 03:29

## 2019-04-24 RX ADMIN — PIPERACILLIN AND TAZOBACTAM 25 GRAM(S): 4; .5 INJECTION, POWDER, LYOPHILIZED, FOR SOLUTION INTRAVENOUS at 14:30

## 2019-04-24 RX ADMIN — Medication 975 MILLIGRAM(S): at 00:19

## 2019-04-24 RX ADMIN — Medication 1 TABLET(S): at 14:30

## 2019-04-24 RX ADMIN — Medication 325 MILLIGRAM(S): at 14:30

## 2019-04-24 RX ADMIN — Medication 975 MILLIGRAM(S): at 19:07

## 2019-04-24 RX ADMIN — Medication 975 MILLIGRAM(S): at 10:14

## 2019-04-24 RX ADMIN — Medication 200 MICROGRAM(S): at 10:15

## 2019-04-24 RX ADMIN — HEPARIN SODIUM 5000 UNIT(S): 5000 INJECTION INTRAVENOUS; SUBCUTANEOUS at 20:02

## 2019-04-24 RX ADMIN — Medication 975 MILLIGRAM(S): at 16:19

## 2019-04-24 RX ADMIN — Medication 600 MILLIGRAM(S): at 19:07

## 2019-04-24 RX ADMIN — Medication 600 MILLIGRAM(S): at 23:41

## 2019-04-24 RX ADMIN — Medication 600 MILLIGRAM(S): at 17:18

## 2019-04-24 NOTE — H&P ADULT - ASSESSMENT
27 yo , POD#4 s/p rLTCS and b/l tubal ligation. PMSH significant for polycystic kidney disease s/p L nephrectomy (), and Graves Dz s/p thyroidectomy, presents wit 1 day of fever (at home Tmax 100.7), chills, rigors. Pt was discharged home on . In ED, pt intially tachycardic to 117,which resolved with 1L NS bolus. She also received clindamycin x 1. Tmax 99. 1 rectally, WBC 24.16. H/H 11.8/37.5. Lactate 1. UA: large LE, >50 RBC, >50 WBC. TVUS significant for a tubular hypoechoic and avascular collection adjacent to the right ovary, measuring approximately 8.0 x 3.2 x 2.9 cm, in the context of patient's recent tubal ligation this may represent hydrosalpinx or seroma. Within the subcutaneous tissue anterior to the uterus and bladder, there is a ill-defined, hypoechoic, avascular collection measuring 7.2 x 6.6 x 1.3 cm, likely postoperative seroma from patient's recent .   DDx: infected seromas vs. pelvic abscesses    Order CT/AP with IV contrast to further evaluated collections. F/u results. Consult IR in am for possible percutaneous drainage of collections.  ID: s/p clindamycin x 1 in ED. Start Zosyn q8h.   -Trend leukocytosis. Daily CBC + Diff  -Monitor fever curve  -F/u bcx x 2 () and ucx ()  Neuro: tylenol and oxy PRN pain  CV: Hemodynamically stable. AM CBC and coags for possible procedure  Pulm: Saturating well on room air, encourage incentive spirometry  GI: NPO for IR consult in AM for possible IR drainage  : voiding spontaneously  Heme: hold HSQ for poss IR procedure SCDs for DVT ppx  Dispo: Continue inpatient care    D/w Dr. Brodie Waite PGY2

## 2019-04-24 NOTE — H&P ADULT - NSHPPHYSICALEXAM_GEN_ALL_CORE
Vital Signs Last 24 Hrs  T(C): 37.3 (24 Apr 2019 03:16), Max: 37.4 (23 Apr 2019 23:40)  T(F): 99.1 (24 Apr 2019 03:16), Max: 99.3 (23 Apr 2019 23:40)  HR: 108 (24 Apr 2019 03:16) (86 - 117)  BP: 112/73 (24 Apr 2019 03:16) (109/55 - 129/59)  BP(mean): --  RR: 18 (24 Apr 2019 03:16) (17 - 18)  SpO2: 100% (24 Apr 2019 03:16) (98% - 100%)    PHYSICAL EXAM:    GENERAL: NAD, well-developed  ABDOMEN: Soft,, tender at uterine fundus to deep palpation. minimal RLQ and LLQ tenderness Bowel sounds present, No rebound, No guarding  INCISION: Pfannenstiel incision c/d/i, no erythema, edema, or drainage.  EXTREMITIES:  2+ Peripheral Pulses, No clubbing, cyanosis, or edema, Viky's sign negative  PELVIC:   -Inspection: no vulvar or labial lesions, erythema.   -Speculum exam: normal lochia, cervix appears closed, no vaginal discharge  -Bimanual exam: uterine fundal tenderness, adnexa nonpalpable nontender bilaterally  Breasts; WNL b/l,

## 2019-04-24 NOTE — PATIENT PROFILE ADULT - STATED REASON FOR ADMISSION
"I had chills when I got home and took my  at home and it was 100.7 ."
"I had chills when I got home and took my  at home and it was 100.7 ."

## 2019-04-24 NOTE — H&P ADULT - NSHPLABSRESULTS_GEN_ALL_CORE
LABS:                        11.8   24.16 )-----------( 219      ( 2019 21:52 )             37.5         139  |  102  |  7   ----------------------------<  76  3.7   |  23  |  0.65    Ca    8.5      2019 21:52    TPro  6.5  /  Alb  3.0<L>  /  TBili  0.3  /  DBili  x   /  AST  14  /  ALT  14  /  AlkPhos  115        Urinalysis Basic - ( 2019 22:15 )    Color: LIGHT ORANGE / Appearance: Lt TURBID / S.011 / pH: 6.0  Gluc: NEGATIVE / Ketone: NEGATIVE  / Bili: NEGATIVE / Urobili: NORMAL   Blood: LARGE / Protein: 30 / Nitrite: NEGATIVE   Leuk Esterase: LARGE / RBC: >50 / WBC >50   Sq Epi: OCC / Non Sq Epi: x / Bacteria: FEW        RADIOLOGY & ADDITIONAL STUDIES:  < from: US Pelvis Complete (19 @ 23:01) >  EXAM:  US PELVIC COMPLETE        PROCEDURE DATE:  2019         INTERPRETATION:  CLINICAL INFORMATION: Fever, status post  and   tubal ligation 3 days ago.    LMP: Postpartum, recent  3 days ago.    COMPARISON: Transvaginal ultrasound from 10/18/2011.    TECHNIQUE: Transabdominal pelvic sonogram only. Color and Spectral   Doppler was performed.  As per patient, instructed by primary clinician   to not have a transvaginal sonogram.    FINDINGS:    Uterus: 17.3 x 9.0 x 10.9 cm. Postpartum changes of the uterus.    Endometrium: 23 mm. Predominantly avascular. No evidence of retained   products of conception or abnormal vascularity.    Right ovary: 3.4 x 1.7 x 2.8 cm. Within normal limits.      There is a tubular predominantly hypoechoic and avascular collection   adjacent to the right ovary, measuring approximately 8.0 x 3.2 x 2.9 cm,   in the context of patient's recent tubal ligation this may represent   hydrosalpinx or seroma.    Left ovary: 3.0 x 2.2 x 3.0 cm. Within normal limits.        Miscellaneous: Within the subcutaneous tissue anterior to the uetrus and   bladder, there is a ill-defined, hypoechoic, avascular collection   measuring 7.2 x 6.6 x 1.3 cm, likely postoperative seroma from patient's   recent .    IMPRESSION:    Postoperative collection, within the ventral abdominal subcutaneous soft   tissues. Correlate clinically for superinfection.    Tubular avascular, hypoechoic collection adjacent to the right ovary,   differential includes hydrosalpinx or postoperative seroma.    If there is persistent clinical concern for an infection, cross-sectional   imaging can be obtained for further evaluation.    < end of copied text >

## 2019-04-25 LAB
APPEARANCE UR: SIGNIFICANT CHANGE UP
BACTERIA # UR AUTO: HIGH
BACTERIA UR CULT: SIGNIFICANT CHANGE UP
BASOPHILS # BLD AUTO: 0.06 K/UL — SIGNIFICANT CHANGE UP (ref 0–0.2)
BASOPHILS NFR BLD AUTO: 0.3 % — SIGNIFICANT CHANGE UP (ref 0–2)
BILIRUB UR-MCNC: NEGATIVE — SIGNIFICANT CHANGE UP
BLOOD UR QL VISUAL: HIGH
COLOR SPEC: SIGNIFICANT CHANGE UP
EOSINOPHIL # BLD AUTO: 0.37 K/UL — SIGNIFICANT CHANGE UP (ref 0–0.5)
EOSINOPHIL NFR BLD AUTO: 1.7 % — SIGNIFICANT CHANGE UP (ref 0–6)
GLUCOSE UR-MCNC: NEGATIVE — SIGNIFICANT CHANGE UP
HCT VFR BLD CALC: 34.2 % — LOW (ref 34.5–45)
HGB BLD-MCNC: 10.6 G/DL — LOW (ref 11.5–15.5)
IMM GRANULOCYTES NFR BLD AUTO: 1.8 % — HIGH (ref 0–1.5)
KETONES UR-MCNC: NEGATIVE — SIGNIFICANT CHANGE UP
LEUKOCYTE ESTERASE UR-ACNC: SIGNIFICANT CHANGE UP
LYMPHOCYTES # BLD AUTO: 1.95 K/UL — SIGNIFICANT CHANGE UP (ref 1–3.3)
LYMPHOCYTES # BLD AUTO: 9.1 % — LOW (ref 13–44)
MCHC RBC-ENTMCNC: 27.7 PG — SIGNIFICANT CHANGE UP (ref 27–34)
MCHC RBC-ENTMCNC: 31 % — LOW (ref 32–36)
MCV RBC AUTO: 89.5 FL — SIGNIFICANT CHANGE UP (ref 80–100)
MONOCYTES # BLD AUTO: 2.46 K/UL — HIGH (ref 0–0.9)
MONOCYTES NFR BLD AUTO: 11.5 % — SIGNIFICANT CHANGE UP (ref 2–14)
NEUTROPHILS # BLD AUTO: 16.11 K/UL — HIGH (ref 1.8–7.4)
NEUTROPHILS NFR BLD AUTO: 75.6 % — SIGNIFICANT CHANGE UP (ref 43–77)
NITRITE UR-MCNC: NEGATIVE — SIGNIFICANT CHANGE UP
NRBC # FLD: 0 K/UL — SIGNIFICANT CHANGE UP (ref 0–0)
PH UR: 7 — SIGNIFICANT CHANGE UP (ref 5–8)
PLATELET # BLD AUTO: 258 K/UL — SIGNIFICANT CHANGE UP (ref 150–400)
PMV BLD: 10.2 FL — SIGNIFICANT CHANGE UP (ref 7–13)
PROT UR-MCNC: 30 — SIGNIFICANT CHANGE UP
RBC # BLD: 3.82 M/UL — SIGNIFICANT CHANGE UP (ref 3.8–5.2)
RBC # FLD: 14.5 % — SIGNIFICANT CHANGE UP (ref 10.3–14.5)
RBC CASTS # UR COMP ASSIST: >50 — HIGH (ref 0–?)
SP GR SPEC: 1.01 — SIGNIFICANT CHANGE UP (ref 1–1.04)
SPECIMEN SOURCE: SIGNIFICANT CHANGE UP
SQUAMOUS # UR AUTO: SIGNIFICANT CHANGE UP
UROBILINOGEN FLD QL: NORMAL — SIGNIFICANT CHANGE UP
WBC # BLD: 21.33 K/UL — HIGH (ref 3.8–10.5)
WBC # FLD AUTO: 21.33 K/UL — HIGH (ref 3.8–10.5)
WBC UR QL: >50 — HIGH (ref 0–?)

## 2019-04-25 RX ORDER — LANOLIN
1 OINTMENT (GRAM) TOPICAL THREE TIMES A DAY
Qty: 0 | Refills: 0 | Status: DISCONTINUED | OUTPATIENT
Start: 2019-04-25 | End: 2019-04-30

## 2019-04-25 RX ORDER — DIPHENHYDRAMINE HCL 50 MG
50 CAPSULE ORAL AT BEDTIME
Qty: 0 | Refills: 0 | Status: DISCONTINUED | OUTPATIENT
Start: 2019-04-25 | End: 2019-04-30

## 2019-04-25 RX ORDER — DIPHENHYDRAMINE HCL 50 MG
25 CAPSULE ORAL ONCE
Qty: 0 | Refills: 0 | Status: COMPLETED | OUTPATIENT
Start: 2019-04-25 | End: 2019-04-25

## 2019-04-25 RX ADMIN — HEPARIN SODIUM 5000 UNIT(S): 5000 INJECTION INTRAVENOUS; SUBCUTANEOUS at 18:19

## 2019-04-25 RX ADMIN — Medication 1 TABLET(S): at 10:52

## 2019-04-25 RX ADMIN — Medication 600 MILLIGRAM(S): at 10:52

## 2019-04-25 RX ADMIN — Medication 1 MILLIGRAM(S): at 10:52

## 2019-04-25 RX ADMIN — Medication 200 MICROGRAM(S): at 07:25

## 2019-04-25 RX ADMIN — HEPARIN SODIUM 5000 UNIT(S): 5000 INJECTION INTRAVENOUS; SUBCUTANEOUS at 06:58

## 2019-04-25 RX ADMIN — Medication 600 MILLIGRAM(S): at 11:30

## 2019-04-25 RX ADMIN — Medication 975 MILLIGRAM(S): at 06:57

## 2019-04-25 RX ADMIN — PIPERACILLIN AND TAZOBACTAM 25 GRAM(S): 4; .5 INJECTION, POWDER, LYOPHILIZED, FOR SOLUTION INTRAVENOUS at 14:50

## 2019-04-25 RX ADMIN — PIPERACILLIN AND TAZOBACTAM 25 GRAM(S): 4; .5 INJECTION, POWDER, LYOPHILIZED, FOR SOLUTION INTRAVENOUS at 22:54

## 2019-04-25 RX ADMIN — Medication 325 MILLIGRAM(S): at 10:52

## 2019-04-25 RX ADMIN — PIPERACILLIN AND TAZOBACTAM 25 GRAM(S): 4; .5 INJECTION, POWDER, LYOPHILIZED, FOR SOLUTION INTRAVENOUS at 06:58

## 2019-04-25 RX ADMIN — Medication 25 MILLIGRAM(S): at 04:03

## 2019-04-25 RX ADMIN — Medication 50 MILLIGRAM(S): at 23:35

## 2019-04-25 NOTE — PROGRESS NOTE ADULT - PROBLEM SELECTOR PLAN 1
CV: Hemodynamically stable, monitor VS  Pulm: Saturating well on RA.  ID: pt with likely postpartum endometritis - leukocytosis, fundal tenderness. Continue Zosyn, monitor for symptom resolution. Daily CBC  GI: reg diet as tolerated  : dysuria, Ucx negative.   Heme: continue SQH for DVT prophylaxis, no sx of anemia.  Analgesia prn    S Ewumi, R3

## 2019-04-25 NOTE — PROGRESS NOTE ADULT - SUBJECTIVE AND OBJECTIVE BOX
OB Progress Note  HD#2    Note entry delayed 2/2 clinical duties  Subjective:   Pt seen and examined at bedside. She endorses decreased abdominal pain. Pt denies fever, chills, chest pain, SOB, nausea, vomiting, lightheadedness, dizziness.  She is tolerating regular diet and ambulating without difficulty    Objective:  T(F): 97.7 (04-25-19 @ 10:30), Max: 98.9 (04-24-19 @ 17:59)  HR: 110 (04-25-19 @ 10:30) (87 - 110)  BP: 125/63 (04-25-19 @ 10:30) (11/76 - 125/63)  RR: 17 (04-25-19 @ 10:30) (16 - 19)  SpO2: 97% (04-25-19 @ 10:30) (97% - 100%)  Wt(kg): --  I&O's Summary    24 Apr 2019 07:01  -  25 Apr 2019 07:00  --------------------------------------------------------  IN: 125 mL / OUT: 0 mL / NET: 125 mL      MEDICATIONS  (STANDING):  ferrous    sulfate 325 milliGRAM(s) Oral daily  folic acid 1 milliGRAM(s) Oral daily  heparin  Injectable 5000 Unit(s) SubCutaneous every 12 hours  lanolin Cream 1 Application(s) Topical three times a day  levothyroxine 200 MICROGram(s) Oral daily  piperacillin/tazobactam IVPB. 3.375 Gram(s) IV Intermittent every 8 hours  prenatal multivitamin 1 Tablet(s) Oral daily  sodium chloride 0.9%. 1000 milliLiter(s) (125 mL/Hr) IV Continuous <Continuous>    MEDICATIONS  (PRN):  acetaminophen   Tablet .. 975 milliGRAM(s) Oral every 6 hours PRN Mild Pain (1 - 3)  ibuprofen  Tablet. 600 milliGRAM(s) Oral every 6 hours PRN Moderate Pain (4 - 6)  oxyCODONE    IR 5 milliGRAM(s) Oral every 6 hours PRN Severe Pain (7 - 10)  simethicone 80 milliGRAM(s) Chew two times a day PRN Gas      Physical Exam:  Constitutional: NAD, A+O x3  CV: RRR  Lungs: clear to auscultation bilaterally  Abdomen: soft, mild fundal tenderness, no rebound or guarding  Incision: Pfannenstiel clean, dry, intact  Extremities: no calf tenderness bilaterally    LABS:                          10.6   21.33 )-----------( 258      ( 25 Apr 2019 06:15 )             34.2     04-23    139  |  102  |  7   ----------------------------<  76  3.7   |  23  |  0.65    Ca    8.5      23 Apr 2019 21:52    TPro  6.5  /  Alb  3.0<L>  /  TBili  0.3  /  DBili  x   /  AST  14  /  ALT  14  /  AlkPhos  115  04-23

## 2019-04-25 NOTE — PROGRESS NOTE ADULT - ASSESSMENT
27 yo , h/o polycystic kidney disease s/p L nephrectomy (), and Graves Dz s/p thyroidectomy, POD#5 s/p rLTCS and bilateral tubal ligation admitted with abdominal pain and imaging consistent with collection in anterior abdominal wall, likely rectus hematoma. Labs significant for persistent leukocytosis

## 2019-04-26 LAB
BASOPHILS # BLD AUTO: 0.04 K/UL — SIGNIFICANT CHANGE UP (ref 0–0.2)
BASOPHILS NFR BLD AUTO: 0.3 % — SIGNIFICANT CHANGE UP (ref 0–2)
EOSINOPHIL # BLD AUTO: 0.21 K/UL — SIGNIFICANT CHANGE UP (ref 0–0.5)
EOSINOPHIL NFR BLD AUTO: 1.3 % — SIGNIFICANT CHANGE UP (ref 0–6)
HCT VFR BLD CALC: 32.2 % — LOW (ref 34.5–45)
HGB BLD-MCNC: 10.2 G/DL — LOW (ref 11.5–15.5)
IMM GRANULOCYTES NFR BLD AUTO: 1.5 % — SIGNIFICANT CHANGE UP (ref 0–1.5)
LYMPHOCYTES # BLD AUTO: 13.8 % — SIGNIFICANT CHANGE UP (ref 13–44)
LYMPHOCYTES # BLD AUTO: 2.19 K/UL — SIGNIFICANT CHANGE UP (ref 1–3.3)
MCHC RBC-ENTMCNC: 27.9 PG — SIGNIFICANT CHANGE UP (ref 27–34)
MCHC RBC-ENTMCNC: 31.7 % — LOW (ref 32–36)
MCV RBC AUTO: 88 FL — SIGNIFICANT CHANGE UP (ref 80–100)
MONOCYTES # BLD AUTO: 1.66 K/UL — HIGH (ref 0–0.9)
MONOCYTES NFR BLD AUTO: 10.4 % — SIGNIFICANT CHANGE UP (ref 2–14)
NEUTROPHILS # BLD AUTO: 11.56 K/UL — HIGH (ref 1.8–7.4)
NEUTROPHILS NFR BLD AUTO: 72.7 % — SIGNIFICANT CHANGE UP (ref 43–77)
NRBC # FLD: 0 K/UL — SIGNIFICANT CHANGE UP (ref 0–0)
PLATELET # BLD AUTO: 289 K/UL — SIGNIFICANT CHANGE UP (ref 150–400)
PMV BLD: 9.9 FL — SIGNIFICANT CHANGE UP (ref 7–13)
RBC # BLD: 3.66 M/UL — LOW (ref 3.8–5.2)
RBC # FLD: 14.5 % — SIGNIFICANT CHANGE UP (ref 10.3–14.5)
WBC # BLD: 15.9 K/UL — HIGH (ref 3.8–10.5)
WBC # FLD AUTO: 15.9 K/UL — HIGH (ref 3.8–10.5)

## 2019-04-26 RX ADMIN — Medication 975 MILLIGRAM(S): at 04:28

## 2019-04-26 RX ADMIN — Medication 975 MILLIGRAM(S): at 03:14

## 2019-04-26 RX ADMIN — PIPERACILLIN AND TAZOBACTAM 25 GRAM(S): 4; .5 INJECTION, POWDER, LYOPHILIZED, FOR SOLUTION INTRAVENOUS at 14:17

## 2019-04-26 RX ADMIN — Medication 975 MILLIGRAM(S): at 12:12

## 2019-04-26 RX ADMIN — Medication 325 MILLIGRAM(S): at 12:14

## 2019-04-26 RX ADMIN — Medication 1 MILLIGRAM(S): at 12:14

## 2019-04-26 RX ADMIN — Medication 200 MICROGRAM(S): at 06:48

## 2019-04-26 RX ADMIN — HEPARIN SODIUM 5000 UNIT(S): 5000 INJECTION INTRAVENOUS; SUBCUTANEOUS at 18:39

## 2019-04-26 RX ADMIN — Medication 1 APPLICATION(S): at 14:19

## 2019-04-26 RX ADMIN — Medication 1 APPLICATION(S): at 22:47

## 2019-04-26 RX ADMIN — Medication 600 MILLIGRAM(S): at 18:34

## 2019-04-26 RX ADMIN — Medication 975 MILLIGRAM(S): at 13:00

## 2019-04-26 RX ADMIN — PIPERACILLIN AND TAZOBACTAM 25 GRAM(S): 4; .5 INJECTION, POWDER, LYOPHILIZED, FOR SOLUTION INTRAVENOUS at 06:48

## 2019-04-26 RX ADMIN — PIPERACILLIN AND TAZOBACTAM 25 GRAM(S): 4; .5 INJECTION, POWDER, LYOPHILIZED, FOR SOLUTION INTRAVENOUS at 22:46

## 2019-04-26 RX ADMIN — HEPARIN SODIUM 5000 UNIT(S): 5000 INJECTION INTRAVENOUS; SUBCUTANEOUS at 06:48

## 2019-04-26 RX ADMIN — Medication 1 TABLET(S): at 12:14

## 2019-04-26 NOTE — DISCHARGE NOTE OB - PATIENT PORTAL LINK FT
You can access the [facility] Patient Portal, offered by [facility], by registering with the following website: [email or web address]

## 2019-04-26 NOTE — DISCHARGE NOTE OB - CARE PLAN
Principal Discharge DX:	Endometritis following delivery  Goal:	resolution of infection  Assessment and plan of treatment:	continue antibiotics as prescribed  Call your doctor with fevers, chills, intractable nausea/vomiting, severe pain not relieved by Tylenol or Motrin or other acute concerns

## 2019-04-26 NOTE — PROGRESS NOTE ADULT - SUBJECTIVE AND OBJECTIVE BOX
OB Progress Note  HD#3    Subjective:   Patient with low grade fever 3am this morning. She states that today is the best she ever felt. She denies abdominal pain, chills, chest pain, SOB, nausea, vomiting, lightheadedness, dizziness.  She is tolerating regular diet and ambulating without difficulty. Decreased burning with urination.    Objective:  T(C): 36.8 (26 Apr 2019 06:27), Max: 38.2 (26 Apr 2019 03:10)  T(F): 98.3 (26 Apr 2019 06:27), Max: 100.7 (26 Apr 2019 03:10)  HR: 74 (26 Apr 2019 06:27) (74 - 110)  BP: 109/63 (26 Apr 2019 06:27) (105/58 - 133/77)  RR: 18 (26 Apr 2019 06:27) (17 - 20)  SpO2: 97% (26 Apr 2019 06:27) (97% - 99%)    MEDICATIONS  (STANDING):  ferrous    sulfate 325 milliGRAM(s) Oral daily  folic acid 1 milliGRAM(s) Oral daily  heparin  Injectable 5000 Unit(s) SubCutaneous every 12 hours  lanolin Cream 1 Application(s) Topical three times a day  levothyroxine 200 MICROGram(s) Oral daily  piperacillin/tazobactam IVPB. 3.375 Gram(s) IV Intermittent every 8 hours  prenatal multivitamin 1 Tablet(s) Oral daily  sodium chloride 0.9%. 1000 milliLiter(s) (125 mL/Hr) IV Continuous <Continuous>    MEDICATIONS  (PRN):  acetaminophen   Tablet .. 975 milliGRAM(s) Oral every 6 hours PRN Mild Pain (1 - 3)  ibuprofen  Tablet. 600 milliGRAM(s) Oral every 6 hours PRN Moderate Pain (4 - 6)  oxyCODONE    IR 5 milliGRAM(s) Oral every 6 hours PRN Severe Pain (7 - 10)  simethicone 80 milliGRAM(s) Chew two times a day PRN Gas      Physical Exam:  Constitutional: NAD, A+O x3  CV: RRR  Lungs: clear to auscultation bilaterally  Abdomen: soft, no fundal tenderness, no rebound or guarding  Incision: Pfannenstiel clean, dry, intact  Extremities: no calf tenderness bilaterally    LABS:                          10.2   15.90 )-----------( 289      ( 26 Apr 2019 05:32 )             32.2                           10.6   21.33 )-----------( 258      ( 25 Apr 2019 06:15 )             34.2     04-23    139  |  102  |  7   ----------------------------<  76  3.7   |  23  |  0.65    Ca    8.5      23 Apr 2019 21:52    TPro  6.5  /  Alb  3.0<L>  /  TBili  0.3  /  DBili  x   /  AST  14  /  ALT  14  /  AlkPhos  115  04-23

## 2019-04-26 NOTE — PROGRESS NOTE ADULT - ASSESSMENT
27 yo , h/o polycystic kidney disease s/p L nephrectomy (), and Graves Dz s/p thyroidectomy, POD#6 s/p rLTCS and bilateral tubal ligation admitted with abdominal pain and imaging consistent with collection in anterior abdominal wall, likely rectus hematoma. Labs significant for leukocytosis now downtrending

## 2019-04-26 NOTE — PROGRESS NOTE ADULT - SUBJECTIVE AND OBJECTIVE BOX
HD#2    Physical exam:    Vital Signs Last 24 Hrs  T(C): 36.8 (26 Apr 2019 06:27), Max: 38.2 (26 Apr 2019 03:10)  T(F): 98.3 (26 Apr 2019 06:27), Max: 100.7 (26 Apr 2019 03:10)  HR: 74 (26 Apr 2019 06:27) (74 - 110)  BP: 109/63 (26 Apr 2019 06:27) (105/58 - 133/77)  BP(mean): --  RR: 18 (26 Apr 2019 06:27) (17 - 20)  SpO2: 97% (26 Apr 2019 06:27) (97% - 99%)    Gen: NAD  Abdomen: soft NT  Incision: C/D/I  Ext: No calf tenderness    LABS:                        10.2   15.90 )-----------( 289      ( 26 Apr 2019 05:32 )             32.2         Allergies    No Known Allergies    Intolerances      MEDICATIONS  (STANDING):  ferrous    sulfate 325 milliGRAM(s) Oral daily  folic acid 1 milliGRAM(s) Oral daily  heparin  Injectable 5000 Unit(s) SubCutaneous every 12 hours  lanolin Cream 1 Application(s) Topical three times a day  levothyroxine 200 MICROGram(s) Oral daily  piperacillin/tazobactam IVPB. 3.375 Gram(s) IV Intermittent every 8 hours  prenatal multivitamin 1 Tablet(s) Oral daily  sodium chloride 0.9%. 1000 milliLiter(s) (125 mL/Hr) IV Continuous <Continuous>    MEDICATIONS  (PRN):  acetaminophen   Tablet .. 975 milliGRAM(s) Oral every 6 hours PRN Mild Pain (1 - 3)  diphenhydrAMINE 50 milliGRAM(s) Oral at bedtime PRN Insomnia  ibuprofen  Tablet. 600 milliGRAM(s) Oral every 6 hours PRN Moderate Pain (4 - 6)  oxyCODONE    IR 5 milliGRAM(s) Oral every 6 hours PRN Severe Pain (7 - 10)  simethicone 80 milliGRAM(s) Chew two times a day PRN Gas        Assessment and Plan: SP CS BTL day 6 readmit day 3 with rectus hematoma and temp ro abscess    low grade temp last PM  WBC decreasing nicely  pt symptomatically improved  if still temp tomorrow will re CT abd/pelvis  continue Zosyn  all discussed with patient

## 2019-04-26 NOTE — DISCHARGE NOTE OB - HOSPITAL COURSE
Patient presented with low grade fevers at home, abdominal pain, and leukocytosis. She was started on Zosyn and cultures were sent. CT showed likely rectus hematoma, without intra-abdominal abscess. On HD#3, patient had a low grade fever; however, she continued to improve symptomatically so course was continued. Pt remained afebrile and hemodynamically stable.    On HD#, she was transitioned to oral antibiotics and discharged home after monitoring inpatient. 29y/o  postpartum day 10 presented with low grade fevers at home, abdominal pain, and leukocytosis. She was started on Zosyn and cultures were sent. CT showed likely rectus hematoma, without intra-abdominal abscess. On HD#3, patient had a low grade fever; however and HD4 had another low grade fever; decision was made to repeat CT scan; showing 5.9x2.4cm collection in the anterior abdominal wall; IR was then consulted and Dr. Werner placed IR drain in RLQ. RENNY drain still intact and is to remain for 1 week per IR, despite low output. Patient has been afebrile and vital signs stable. Denies abdominal pain. Pt is to be discharged home with Augmentin BID for 10 days and close outpatient follow up by OB in 1 week and Dr Werner (IR) in 1 week for drain removal.

## 2019-04-26 NOTE — DISCHARGE NOTE OB - CARE PROVIDER_API CALL
Nicola Calloway)  Obstetrics and Gynecology  [address] [facility], [address] [place] [address]  Phone: [phone]  Fax: [phone]  Follow Up Time:

## 2019-04-26 NOTE — PROGRESS NOTE ADULT - PROBLEM SELECTOR PLAN 1
CV: Hemodynamically stable, monitor VS  Pulm: Saturating well on RA.  ID: febrile s/p >24hrs Zosyn. Pt now afebrile, with downtrendgin leukocytosis, no fundal tenderness. Monitor fever curve today. Likely transition to PO abx tonight or tomorrow if continues to improve. Daily CBC  GI: reg diet as tolerated  : dysuria, Ucx negative. UA with leuk esterase, mod bacteria. F/u repeat Ucx (4/26)  Heme: continue SQH for DVT prophylaxis, no sx of anemia.  Analgesia prn    S Ewumi, R3

## 2019-04-26 NOTE — CHART NOTE - NSCHARTNOTEFT_GEN_A_CORE
R3 Ob note    pt examined at bedside for temp of 38.2. Pt states that she has been not taking tylenol and motrin standing as she usually does so that she does not mask fever. Notes +fevers, but feels well. Notes improvement of abdominal pain. Denies CP, SOB. Denies changes in GI/ fxn.    Vital Signs Last 24 Hrs  T(C): 37.3 (26 Apr 2019 04:27), Max: 38.2 (26 Apr 2019 03:10)  T(F): 99.1 (26 Apr 2019 04:27), Max: 100.7 (26 Apr 2019 03:10)  HR: 96 (26 Apr 2019 02:15) (87 - 110)  BP: 105/58 (26 Apr 2019 02:15) (105/58 - 133/77)  BP(mean): --  RR: 18 (26 Apr 2019 02:15) (17 - 20)  SpO2: 98% (26 Apr 2019 02:15) (97% - 99%)    Gen:AAOX4, NAD  ABD: soft, gravid, no uterine tenderness  Ext: NTBL  Breast: nt    A/P: 28 P3 POD#6 status post C/S now HD#3 a/w abdominal collection (likely rectus hematoma) and low grade fever likely secondary to endometritis, now febrile overnight  -c/w antibiotics, f/u CBC in am  -clinically improving. Fever likely secondary to existing endometritis   -fever resolved with tylenol    Therese, PGY-3

## 2019-04-27 LAB
BACTERIA UR CULT: SIGNIFICANT CHANGE UP
BASOPHILS # BLD AUTO: 0.06 K/UL — SIGNIFICANT CHANGE UP (ref 0–0.2)
BASOPHILS NFR BLD AUTO: 0.3 % — SIGNIFICANT CHANGE UP (ref 0–2)
EOSINOPHIL # BLD AUTO: 0.21 K/UL — SIGNIFICANT CHANGE UP (ref 0–0.5)
EOSINOPHIL NFR BLD AUTO: 1.2 % — SIGNIFICANT CHANGE UP (ref 0–6)
HCT VFR BLD CALC: 34.8 % — SIGNIFICANT CHANGE UP (ref 34.5–45)
HGB BLD-MCNC: 11.2 G/DL — LOW (ref 11.5–15.5)
IMM GRANULOCYTES NFR BLD AUTO: 2.1 % — HIGH (ref 0–1.5)
LYMPHOCYTES # BLD AUTO: 1.59 K/UL — SIGNIFICANT CHANGE UP (ref 1–3.3)
LYMPHOCYTES # BLD AUTO: 8.8 % — LOW (ref 13–44)
MCHC RBC-ENTMCNC: 27.4 PG — SIGNIFICANT CHANGE UP (ref 27–34)
MCHC RBC-ENTMCNC: 32.2 % — SIGNIFICANT CHANGE UP (ref 32–36)
MCV RBC AUTO: 85.1 FL — SIGNIFICANT CHANGE UP (ref 80–100)
MONOCYTES # BLD AUTO: 1.63 K/UL — HIGH (ref 0–0.9)
MONOCYTES NFR BLD AUTO: 9 % — SIGNIFICANT CHANGE UP (ref 2–14)
NEUTROPHILS # BLD AUTO: 14.22 K/UL — HIGH (ref 1.8–7.4)
NEUTROPHILS NFR BLD AUTO: 78.6 % — HIGH (ref 43–77)
NRBC # FLD: 0 K/UL — SIGNIFICANT CHANGE UP (ref 0–0)
PLATELET # BLD AUTO: 342 K/UL — SIGNIFICANT CHANGE UP (ref 150–400)
PMV BLD: 9.6 FL — SIGNIFICANT CHANGE UP (ref 7–13)
RBC # BLD: 4.09 M/UL — SIGNIFICANT CHANGE UP (ref 3.8–5.2)
RBC # FLD: 14.4 % — SIGNIFICANT CHANGE UP (ref 10.3–14.5)
SPECIMEN SOURCE: SIGNIFICANT CHANGE UP
WBC # BLD: 18.09 K/UL — HIGH (ref 3.8–10.5)
WBC # FLD AUTO: 18.09 K/UL — HIGH (ref 3.8–10.5)

## 2019-04-27 RX ORDER — PIPERACILLIN AND TAZOBACTAM 4; .5 G/20ML; G/20ML
3.38 INJECTION, POWDER, LYOPHILIZED, FOR SOLUTION INTRAVENOUS EVERY 8 HOURS
Qty: 0 | Refills: 0 | Status: DISCONTINUED | OUTPATIENT
Start: 2019-04-27 | End: 2019-04-30

## 2019-04-27 RX ADMIN — Medication 600 MILLIGRAM(S): at 21:00

## 2019-04-27 RX ADMIN — Medication 1 APPLICATION(S): at 06:20

## 2019-04-27 RX ADMIN — Medication 1 TABLET(S): at 12:12

## 2019-04-27 RX ADMIN — Medication 1 APPLICATION(S): at 14:54

## 2019-04-27 RX ADMIN — Medication 1 MILLIGRAM(S): at 12:12

## 2019-04-27 RX ADMIN — PIPERACILLIN AND TAZOBACTAM 25 GRAM(S): 4; .5 INJECTION, POWDER, LYOPHILIZED, FOR SOLUTION INTRAVENOUS at 22:32

## 2019-04-27 RX ADMIN — Medication 1 TABLET(S): at 14:54

## 2019-04-27 RX ADMIN — Medication 1 APPLICATION(S): at 22:47

## 2019-04-27 RX ADMIN — Medication 325 MILLIGRAM(S): at 12:12

## 2019-04-27 RX ADMIN — Medication 600 MILLIGRAM(S): at 20:06

## 2019-04-27 RX ADMIN — Medication 975 MILLIGRAM(S): at 20:08

## 2019-04-27 RX ADMIN — Medication 200 MICROGRAM(S): at 06:19

## 2019-04-27 RX ADMIN — Medication 975 MILLIGRAM(S): at 20:45

## 2019-04-27 RX ADMIN — PIPERACILLIN AND TAZOBACTAM 25 GRAM(S): 4; .5 INJECTION, POWDER, LYOPHILIZED, FOR SOLUTION INTRAVENOUS at 06:19

## 2019-04-27 NOTE — CHART NOTE - NSCHARTNOTEFT_GEN_A_CORE
Patient evaluated ~7pm for fever   She feels fatigued today, and reports persistent abdominal pain. Still endorses dysuria. No chest pain, SOB, lightheadedness or dizziness.    T 100.5F, /80, , Sp100%RA  Gen: nad  Heart: mild tachycardia, reg rhythm  Lungs: CTAB  Abd: mild suprapubic tenderness, no rebound or guarding  Ext: no calf tenderness bilaterally    - dc Augmentin, to switch back to Zosyn  - repeat CTA/P to evaluate for new collection. Possible IR eval  - am CBC    Dr. Sahil Onofre, R3

## 2019-04-27 NOTE — PROGRESS NOTE ADULT - PROBLEM SELECTOR PLAN 1
CV: Hemodynamically stable, monitor VS  Pulm: Saturating well on RA.  ID: Pt afebrile overnight, with downtrending leukocytosis, no fundal tenderness. Likely transition to PO abx  Daily CBC  GI: reg diet as tolerated  : Ucx negative. UA with leuk esterase, mod bacteria. F/u repeat Ucx (4/26)  Heme: continue SQH for DVT prophylaxis, no sx of anemia.  Analgesia prn    Therese, PGY-3

## 2019-04-27 NOTE — PROGRESS NOTE ADULT - SUBJECTIVE AND OBJECTIVE BOX
HD#3    Physical exam:    Vital Signs Last 24 Hrs  T(C): 37.6 (27 Apr 2019 05:37), Max: 37.6 (27 Apr 2019 05:37)  T(F): 99.7 (27 Apr 2019 05:37), Max: 99.7 (27 Apr 2019 05:37)  HR: 102 (27 Apr 2019 05:37) (82 - 108)  BP: 104/63 (27 Apr 2019 05:37) (100/62 - 129/66)  BP(mean): --  RR: 16 (27 Apr 2019 05:37) (16 - 20)  SpO2: 100% (27 Apr 2019 05:37) (98% - 100%)    Gen: NAD  Abdomen: Gravid  Ext: No calf tenderness    LABS:                        11.2   18.09 )-----------( 342      ( 27 Apr 2019 06:33 )             34.8       Allergies    No Known Allergies    Intolerances      MEDICATIONS  (STANDING):  amoxicillin  875 milliGRAM(s)/clavulanate 1 Tablet(s) Oral every 12 hours  ferrous    sulfate 325 milliGRAM(s) Oral daily  folic acid 1 milliGRAM(s) Oral daily  heparin  Injectable 5000 Unit(s) SubCutaneous every 12 hours  lanolin Cream 1 Application(s) Topical three times a day  levothyroxine 200 MICROGram(s) Oral daily  prenatal multivitamin 1 Tablet(s) Oral daily  sodium chloride 0.9%. 1000 milliLiter(s) (125 mL/Hr) IV Continuous <Continuous>    MEDICATIONS  (PRN):  acetaminophen   Tablet .. 975 milliGRAM(s) Oral every 6 hours PRN Mild Pain (1 - 3)  diphenhydrAMINE 50 milliGRAM(s) Oral at bedtime PRN Insomnia  ibuprofen  Tablet. 600 milliGRAM(s) Oral every 6 hours PRN Moderate Pain (4 - 6)  oxyCODONE    IR 5 milliGRAM(s) Oral every 6 hours PRN Severe Pain (7 - 10)  simethicone 80 milliGRAM(s) Chew two times a day PRN Gas        Assessment and Plan:    Rectus hematoma pt improved    afebrile >24 hours  change to Augmentin 875 mg BID  if afebrile will discharge tomorrow  sl increase in WBC but still afebrile  contine to observe

## 2019-04-27 NOTE — PROGRESS NOTE ADULT - SUBJECTIVE AND OBJECTIVE BOX
R3 Postpartum Note    Patient seen and examined at bedside, no acute overnight events. No acute complaints, pain well controlled.  Patient is ambulating, voiding spontaneously, passing flatus, and tolerating regular diet.   Vital Signs Last 24 Hours  T(C): 36.9 (04-27-19 @ 02:11), Max: 37.4 (04-26-19 @ 10:25)  HR: 82 (04-27-19 @ 02:11) (74 - 108)  BP: 113/66 (04-27-19 @ 02:11) (100/62 - 129/66)  RR: 16 (04-27-19 @ 02:11) (16 - 20)  SpO2: 99% (04-27-19 @ 02:11) (97% - 100%)    Physical Exam:  General: NAD  Abdomen: Soft, non-tender, non-distended, fundus firm  Incision: Pfannenstiel incision CDI, subcuticular suture closure  Pelvic: Lochia wnl    Labs:    Blood Type: O Positive  Antibody Screen: Negative  RPR: Negative               10.2   15.90 )-----------( 289      ( 04-26 @ 05:32 )             32.2                10.6   21.33 )-----------( 258      ( 04-25 @ 06:15 )             34.2                11.6   22.67 )-----------( 246      ( 04-24 @ 10:00 )             37.3         MEDICATIONS  (STANDING):  ferrous    sulfate 325 milliGRAM(s) Oral daily  folic acid 1 milliGRAM(s) Oral daily  heparin  Injectable 5000 Unit(s) SubCutaneous every 12 hours  lanolin Cream 1 Application(s) Topical three times a day  levothyroxine 200 MICROGram(s) Oral daily  piperacillin/tazobactam IVPB. 3.375 Gram(s) IV Intermittent every 8 hours  prenatal multivitamin 1 Tablet(s) Oral daily  sodium chloride 0.9%. 1000 milliLiter(s) (125 mL/Hr) IV Continuous <Continuous>    MEDICATIONS  (PRN):  acetaminophen   Tablet .. 975 milliGRAM(s) Oral every 6 hours PRN Mild Pain (1 - 3)  diphenhydrAMINE 50 milliGRAM(s) Oral at bedtime PRN Insomnia  ibuprofen  Tablet. 600 milliGRAM(s) Oral every 6 hours PRN Moderate Pain (4 - 6)  oxyCODONE    IR 5 milliGRAM(s) Oral every 6 hours PRN Severe Pain (7 - 10)  simethicone 80 milliGRAM(s) Chew two times a day PRN Gas

## 2019-04-27 NOTE — PROGRESS NOTE ADULT - ASSESSMENT
29 yo , h/o polycystic kidney disease s/p L nephrectomy (), and Graves Dz s/p thyroidectomy, POD#7 s/p rLTCS and bilateral tubal ligation admitted with abdominal pain and low grade fevers and imaging consistent with collection in anterior abdominal wall, likely rectus hematoma. Fever likely secondary to endometritis, clinically improving. Labs significant for leukocytosis now downtrending. Afebrile overnight.

## 2019-04-28 LAB
ANION GAP SERPL CALC-SCNC: 13 MMO/L — SIGNIFICANT CHANGE UP (ref 7–14)
APTT BLD: 29.3 SEC — SIGNIFICANT CHANGE UP (ref 27.5–36.3)
BACTERIA BLD CULT: SIGNIFICANT CHANGE UP
BACTERIA BLD CULT: SIGNIFICANT CHANGE UP
BASOPHILS # BLD AUTO: 0.08 K/UL — SIGNIFICANT CHANGE UP (ref 0–0.2)
BASOPHILS NFR BLD AUTO: 0.4 % — SIGNIFICANT CHANGE UP (ref 0–2)
BUN SERPL-MCNC: 8 MG/DL — SIGNIFICANT CHANGE UP (ref 7–23)
CALCIUM SERPL-MCNC: 8.4 MG/DL — SIGNIFICANT CHANGE UP (ref 8.4–10.5)
CHLORIDE SERPL-SCNC: 103 MMOL/L — SIGNIFICANT CHANGE UP (ref 98–107)
CO2 SERPL-SCNC: 24 MMOL/L — SIGNIFICANT CHANGE UP (ref 22–31)
CREAT SERPL-MCNC: 0.7 MG/DL — SIGNIFICANT CHANGE UP (ref 0.5–1.3)
EOSINOPHIL # BLD AUTO: 0.3 K/UL — SIGNIFICANT CHANGE UP (ref 0–0.5)
EOSINOPHIL NFR BLD AUTO: 1.7 % — SIGNIFICANT CHANGE UP (ref 0–6)
GLUCOSE SERPL-MCNC: 67 MG/DL — LOW (ref 70–99)
HCT VFR BLD CALC: 37.4 % — SIGNIFICANT CHANGE UP (ref 34.5–45)
HGB BLD-MCNC: 11.6 G/DL — SIGNIFICANT CHANGE UP (ref 11.5–15.5)
IMM GRANULOCYTES NFR BLD AUTO: 3.5 % — HIGH (ref 0–1.5)
INR BLD: 1.04 — SIGNIFICANT CHANGE UP (ref 0.88–1.17)
LYMPHOCYTES # BLD AUTO: 1.67 K/UL — SIGNIFICANT CHANGE UP (ref 1–3.3)
LYMPHOCYTES # BLD AUTO: 9.4 % — LOW (ref 13–44)
MCHC RBC-ENTMCNC: 27.5 PG — SIGNIFICANT CHANGE UP (ref 27–34)
MCHC RBC-ENTMCNC: 31 % — LOW (ref 32–36)
MCV RBC AUTO: 88.6 FL — SIGNIFICANT CHANGE UP (ref 80–100)
MONOCYTES # BLD AUTO: 1.69 K/UL — HIGH (ref 0–0.9)
MONOCYTES NFR BLD AUTO: 9.5 % — SIGNIFICANT CHANGE UP (ref 2–14)
NEUTROPHILS # BLD AUTO: 13.48 K/UL — HIGH (ref 1.8–7.4)
NEUTROPHILS NFR BLD AUTO: 75.5 % — SIGNIFICANT CHANGE UP (ref 43–77)
NRBC # FLD: 0 K/UL — SIGNIFICANT CHANGE UP (ref 0–0)
PLATELET # BLD AUTO: 349 K/UL — SIGNIFICANT CHANGE UP (ref 150–400)
PMV BLD: 9.2 FL — SIGNIFICANT CHANGE UP (ref 7–13)
POTASSIUM SERPL-MCNC: 4 MMOL/L — SIGNIFICANT CHANGE UP (ref 3.5–5.3)
POTASSIUM SERPL-SCNC: 4 MMOL/L — SIGNIFICANT CHANGE UP (ref 3.5–5.3)
PROTHROM AB SERPL-ACNC: 11.9 SEC — SIGNIFICANT CHANGE UP (ref 9.8–13.1)
RBC # BLD: 4.22 M/UL — SIGNIFICANT CHANGE UP (ref 3.8–5.2)
RBC # FLD: 14.4 % — SIGNIFICANT CHANGE UP (ref 10.3–14.5)
SODIUM SERPL-SCNC: 140 MMOL/L — SIGNIFICANT CHANGE UP (ref 135–145)
WBC # BLD: 17.84 K/UL — HIGH (ref 3.8–10.5)
WBC # FLD AUTO: 17.84 K/UL — HIGH (ref 3.8–10.5)

## 2019-04-28 PROCEDURE — 74177 CT ABD & PELVIS W/CONTRAST: CPT | Mod: 26

## 2019-04-28 RX ORDER — SODIUM CHLORIDE 9 MG/ML
1000 INJECTION, SOLUTION INTRAVENOUS
Qty: 0 | Refills: 0 | Status: DISCONTINUED | OUTPATIENT
Start: 2019-04-28 | End: 2019-04-30

## 2019-04-28 RX ADMIN — Medication 200 MICROGRAM(S): at 06:07

## 2019-04-28 RX ADMIN — PIPERACILLIN AND TAZOBACTAM 25 GRAM(S): 4; .5 INJECTION, POWDER, LYOPHILIZED, FOR SOLUTION INTRAVENOUS at 15:05

## 2019-04-28 RX ADMIN — Medication 1 TABLET(S): at 15:04

## 2019-04-28 RX ADMIN — Medication 325 MILLIGRAM(S): at 15:05

## 2019-04-28 RX ADMIN — Medication 1 APPLICATION(S): at 22:40

## 2019-04-28 RX ADMIN — PIPERACILLIN AND TAZOBACTAM 25 GRAM(S): 4; .5 INJECTION, POWDER, LYOPHILIZED, FOR SOLUTION INTRAVENOUS at 06:18

## 2019-04-28 RX ADMIN — Medication 50 MILLIGRAM(S): at 22:30

## 2019-04-28 RX ADMIN — PIPERACILLIN AND TAZOBACTAM 25 GRAM(S): 4; .5 INJECTION, POWDER, LYOPHILIZED, FOR SOLUTION INTRAVENOUS at 22:30

## 2019-04-28 RX ADMIN — Medication 1 MILLIGRAM(S): at 15:05

## 2019-04-28 RX ADMIN — Medication 1 APPLICATION(S): at 06:56

## 2019-04-28 NOTE — PROGRESS NOTE ADULT - SUBJECTIVE AND OBJECTIVE BOX
R3 Postpartum Note    Patient with low grade fever yesterday, defervesced following Tylenol. No complaints this am.   Patient is ambulating, voiding spontaneously, passing flatus, and tolerating regular diet.     Vital Signs Last 24 Hrs  T(C): 36.5 (28 Apr 2019 05:21), Max: 38.1 (27 Apr 2019 19:16)  T(F): 97.7 (28 Apr 2019 05:21), Max: 100.5 (27 Apr 2019 19:16)  HR: 70 (28 Apr 2019 05:21) (68 - 112)  BP: 104/67 (28 Apr 2019 05:21) (104/67 - 138/80)  BP(mean): --  RR: 18 (28 Apr 2019 05:21) (17 - 18)  SpO2: 96% (28 Apr 2019 05:21) (96% - 100%)    Physical Exam  General: NAD  Heart: RRR  Lungs: CTAB  Abdomen: Soft, mild suprapubic tenderness, no rebound or guarding non-distended, fundus firm  Incision: Pfannenstiel incision CDI, subcuticular suture closure  Pelvic: Lochia wnl    Labs:    Blood Type: O Positive  Antibody Screen: Negative  RPR: Negative                        11.2   18.09 )-----------( 342      ( 27 Apr 2019 06:33 )             34.8                  10.2   15.90 )-----------( 289      ( 04-26 @ 05:32 )             32.2                10.6   21.33 )-----------( 258      ( 04-25 @ 06:15 )             34.2                11.6   22.67 )-----------( 246      ( 04-24 @ 10:00 )             37.3                             11.2   18.09 )-----------( 342      ( 27 Apr 2019 06:33 )             34.8

## 2019-04-28 NOTE — PROGRESS NOTE ADULT - SUBJECTIVE AND OBJECTIVE BOX
Patient is 28y  old.    readmit rectus collection    Vital Signs Last 24 Hrs  T(C): 36.9 (28 Apr 2019 14:19), Max: 38.1 (27 Apr 2019 19:16)  T(F): 98.4 (28 Apr 2019 14:19), Max: 100.5 (27 Apr 2019 19:16)  HR: 90 (28 Apr 2019 14:19) (68 - 112)  BP: 117/66 (28 Apr 2019 14:19) (104/67 - 138/80)  BP(mean): --  RR: 17 (28 Apr 2019 14:19) (17 - 19)  SpO2: 100% (28 Apr 2019 14:19) (96% - 100%)    I&O's Detail      Labs:                        11.6   17.84 )-----------( 349      ( 28 Apr 2019 06:15 )             37.4                         11.2   18.09 )-----------( 342      ( 27 Apr 2019 06:33 )             34.8         PT/INR - ( 28 Apr 2019 10:44 )   PT: 11.9 SEC;   INR: 1.04          PTT - ( 28 Apr 2019 10:44 )  PTT:29.3 SEC    Fibrinogen:     Lactate:     MEDICATIONS  (STANDING):  ferrous    sulfate 325 milliGRAM(s) Oral daily  folic acid 1 milliGRAM(s) Oral daily  heparin  Injectable 5000 Unit(s) SubCutaneous every 12 hours  lanolin Cream 1 Application(s) Topical three times a day  levothyroxine 200 MICROGram(s) Oral daily  piperacillin/tazobactam IVPB. 3.375 Gram(s) IV Intermittent every 8 hours  prenatal multivitamin 1 Tablet(s) Oral daily  sodium chloride 0.9%. 1000 milliLiter(s) (125 mL/Hr) IV Continuous <Continuous>      Evaluation :rectus hematoma fever      Management and Follow-up plan :  second CT with slightly bigger collection more formed  IR called and pt placed on tomorrows schedule for drainage  i dw patient possibility of drainage   continue Zosyn  coags normal  pumping encouraged    Elizabeth VILLAFUERTE              -------------------------------------------------------------------------------------------------------------

## 2019-04-28 NOTE — PROGRESS NOTE ADULT - PROBLEM SELECTOR PLAN 1
CV: Hemodynamically stable, monitor VS  Pulm: Saturating well on RA.  ID: obtain repeat CT today to evaluate for poss developing abscess, might require IR drainage. Continue IV Zosyn. F/u am CBC  GI: reg diet as tolerated  : Ucx negative. UA with leuk esterase, mod bacteria. repeat Ucx negative  Heme: continue SQH for DVT prophylaxis, no sx of anemia.  Analgesia prn    S Ewumi, R3

## 2019-04-28 NOTE — PROGRESS NOTE ADULT - ASSESSMENT
27 yo , h/o polycystic kidney disease s/p L nephrectomy (), and Graves Dz s/p thyroidectomy, POD#8 s/p rLTCS and bilateral tubal ligation admitted with abdominal pain and low grade fevers and imaging consistent with collection in anterior abdominal wall, likely rectus hematoma. Labs significant for leukocytosis. As pt developed fever on Augmentin yesterday, she was transitioned back to Zosyn IV, with plans for further evaluation

## 2019-04-29 LAB
GRAM STN WND: SIGNIFICANT CHANGE UP
SPECIMEN SOURCE: SIGNIFICANT CHANGE UP

## 2019-04-29 PROCEDURE — 10030 IMG GID FLU COLL DRG SFT TIS: CPT

## 2019-04-29 RX ADMIN — PIPERACILLIN AND TAZOBACTAM 25 GRAM(S): 4; .5 INJECTION, POWDER, LYOPHILIZED, FOR SOLUTION INTRAVENOUS at 06:10

## 2019-04-29 RX ADMIN — Medication 325 MILLIGRAM(S): at 13:29

## 2019-04-29 RX ADMIN — Medication 1 MILLIGRAM(S): at 13:29

## 2019-04-29 RX ADMIN — PIPERACILLIN AND TAZOBACTAM 25 GRAM(S): 4; .5 INJECTION, POWDER, LYOPHILIZED, FOR SOLUTION INTRAVENOUS at 22:16

## 2019-04-29 RX ADMIN — Medication 1 APPLICATION(S): at 23:06

## 2019-04-29 RX ADMIN — Medication 1 APPLICATION(S): at 06:09

## 2019-04-29 RX ADMIN — Medication 200 MICROGRAM(S): at 07:18

## 2019-04-29 RX ADMIN — Medication 1 TABLET(S): at 13:29

## 2019-04-29 RX ADMIN — PIPERACILLIN AND TAZOBACTAM 25 GRAM(S): 4; .5 INJECTION, POWDER, LYOPHILIZED, FOR SOLUTION INTRAVENOUS at 13:29

## 2019-04-29 NOTE — PROGRESS NOTE ADULT - SUBJECTIVE AND OBJECTIVE BOX
R3 Postpartum Note    Patient evaluated in chair at bedside. She denies overnight fevers, chills, chest pain, or palpitations. Abdominal pain is absent at this time.   Patient is ambulating, voiding spontaneously, passing flatus, and tolerating regular diet.     Vital Signs Last 24 Hrs  T(C): 37.2 (29 Apr 2019 05:28), Max: 37.2 (28 Apr 2019 10:35)  T(F): 99 (29 Apr 2019 05:28), Max: 99 (28 Apr 2019 10:35)  HR: 88 (29 Apr 2019 05:28) (59 - 99)  BP: 113/67 (29 Apr 2019 05:28) (94/52 - 125/77)  BP(mean): --  RR: 19 (29 Apr 2019 05:28) (17 - 20)  SpO2: 97% (29 Apr 2019 05:28) (97% - 100%)    Physical Exam  General: NAD  Heart: RRR  Lungs: CTAB  Abdomen: Soft, nontender, no rebound or guarding non-distended, fundus firm  Incision: Pfannenstiel incision CDI, subcuticular suture closure  Ext: no BECKY, no calf tenderness bilaterally    Labs:    Blood Type: O Positive  Antibody Screen: Negative  RPR: Negative                                   11.6   17.84 )-----------( 349      ( 28 Apr 2019 06:15 )             37.4                11.2   18.09 )-----------( 342      ( 27 Apr 2019 06:33 )             34.8                  10.2   15.90 )-----------( 289      ( 04-26 @ 05:32 )             32.2                10.6   21.33 )-----------( 258      ( 04-25 @ 06:15 )             34.2                11.6   22.67 )-----------( 246      ( 04-24 @ 10:00 )             37.3                             11.2   18.09 )-----------( 342      ( 27 Apr 2019 06:33 )             34.8

## 2019-04-29 NOTE — PROGRESS NOTE ADULT - SUBJECTIVE AND OBJECTIVE BOX
Vascular & Interventional Radiology Post-Procedure Note    Pre-Procedure Diagnosis: abdominal fluid collection  Post-Procedure Diagnosis: Same as pre.  Indications for Procedure: leukocytosis    Attending: Dr. Werner  Resident: Dr. Contreras    Procedure Details/Findings: US guided placement of 8.5 F drainage catheter. 42cc of viscous pink fluid was aspirated.   Access (if applicable): RLQ abdomen    Complications: none  Estimated Blood Loss: Minimal  Specimen: sent for Cx  Contrast: none  Anesthesia: Local  Patient Condition/Disposition: Stable, Back to Floor    Plan:   - Monitor drain output.   - Recommend continuing ABx  - Flush with 5cc NS daily  - If patient is discharged can f/u with IR as outpatient next week for tube check (office P# [phone])

## 2019-04-29 NOTE — PROGRESS NOTE ADULT - ASSESSMENT
29 yo , h/o polycystic kidney disease s/p L nephrectomy (), and Graves Dz s/p thyroidectomy, POD#9 s/p rLTCS and bilateral tubal ligation admitted with abdominal pain and low grade fevers and imaging consistent with collection in anterior abdominal wall, likely rectus hematoma. Patient was persistently febrile on antibiotics,  CT was repeated with findings of increasing collection size. IR consulted, plans for possible drainage today.

## 2019-04-29 NOTE — PROGRESS NOTE ADULT - SUBJECTIVE AND OBJECTIVE BOX
Vascular & Interventional Radiology Pre-Procedure Note    Procedure Name: aspiration of abdominal fluid collection    HPI: 28y Female s/p  c/b post op anterior abdominal fluid collection with leukocytosis.     Allergies:   Medications (Abx/Cardiac/Anticoagulation/Blood Products)  amoxicillin  875 milliGRAM(s)/clavulanate: 1 Tablet(s) Oral ( @ 14:54)  piperacillin/tazobactam IVPB.: 25 mL/Hr IV Intermittent ( @ 06:10)    Data:  152.4  90.7  T(C): 37.2  HR: 88  BP: 113/67  RR: 19  SpO2: 97%    -WBC 17.84 / HgB 11.6 / Hct 37.4 / Plt 349  -Na 140 / Cl 103 / BUN 8 / Glucose 67  -K 4.0 / CO2 24 / Cr 0.70  -ALT -- / Alk Phos -- / T.Bili --  -INR1.04    Imaging: CT demonstrating anterior abdominal fluid collection.     Plan:   -28y Female presents for image guided aspiration/drainage of abdominal fluid collection.   -Risks/Benefits/alternatives explained with the patient and/or healthcare proxy and witnessed informed consent obtained.

## 2019-04-29 NOTE — PROGRESS NOTE ADULT - PROBLEM SELECTOR PLAN 1
CV: Hemodynamically stable, monitor VS  Pulm: Saturating well on RA.  ID: for IR drainage today, f/u plan. Pt is NPO since midnight. SQ held this am. Continue IV Zosyn. Blood cx (4/23): neg  GI: reg diet as tolerated  : Ucx negative x2. UA with leuk esterase, mod bacteria. No sx  Heme: no sx of anemia. [facility] held this am for procedure  Analgesia prn    S Kingston, R3

## 2019-04-30 ENCOUNTER — TRANSCRIPTION ENCOUNTER (OUTPATIENT)
Age: 29
End: 2019-04-30

## 2019-04-30 VITALS
DIASTOLIC BLOOD PRESSURE: 51 MMHG | OXYGEN SATURATION: 99 % | RESPIRATION RATE: 18 BRPM | TEMPERATURE: 98 F | HEART RATE: 87 BPM | SYSTOLIC BLOOD PRESSURE: 100 MMHG

## 2019-04-30 LAB
BASOPHILS # BLD AUTO: 0.08 K/UL — SIGNIFICANT CHANGE UP (ref 0–0.2)
BASOPHILS NFR BLD AUTO: 0.6 % — SIGNIFICANT CHANGE UP (ref 0–2)
EOSINOPHIL # BLD AUTO: 0.51 K/UL — HIGH (ref 0–0.5)
EOSINOPHIL NFR BLD AUTO: 3.9 % — SIGNIFICANT CHANGE UP (ref 0–6)
HCT VFR BLD CALC: 38.1 % — SIGNIFICANT CHANGE UP (ref 34.5–45)
HGB BLD-MCNC: 11.9 G/DL — SIGNIFICANT CHANGE UP (ref 11.5–15.5)
IMM GRANULOCYTES NFR BLD AUTO: 5 % — HIGH (ref 0–1.5)
LYMPHOCYTES # BLD AUTO: 18.4 % — SIGNIFICANT CHANGE UP (ref 13–44)
LYMPHOCYTES # BLD AUTO: 2.41 K/UL — SIGNIFICANT CHANGE UP (ref 1–3.3)
MCHC RBC-ENTMCNC: 27.7 PG — SIGNIFICANT CHANGE UP (ref 27–34)
MCHC RBC-ENTMCNC: 31.2 % — LOW (ref 32–36)
MCV RBC AUTO: 88.6 FL — SIGNIFICANT CHANGE UP (ref 80–100)
MONOCYTES # BLD AUTO: 0.99 K/UL — HIGH (ref 0–0.9)
MONOCYTES NFR BLD AUTO: 7.5 % — SIGNIFICANT CHANGE UP (ref 2–14)
NEUTROPHILS # BLD AUTO: 8.49 K/UL — HIGH (ref 1.8–7.4)
NEUTROPHILS NFR BLD AUTO: 64.6 % — SIGNIFICANT CHANGE UP (ref 43–77)
NRBC # FLD: 0 K/UL — SIGNIFICANT CHANGE UP (ref 0–0)
PLATELET # BLD AUTO: 461 K/UL — HIGH (ref 150–400)
PMV BLD: 9.1 FL — SIGNIFICANT CHANGE UP (ref 7–13)
RBC # BLD: 4.3 M/UL — SIGNIFICANT CHANGE UP (ref 3.8–5.2)
RBC # FLD: 14.5 % — SIGNIFICANT CHANGE UP (ref 10.3–14.5)
WBC # BLD: 13.13 K/UL — HIGH (ref 3.8–10.5)
WBC # FLD AUTO: 13.13 K/UL — HIGH (ref 3.8–10.5)

## 2019-04-30 RX ORDER — HEPARIN SODIUM 5000 [USP'U]/ML
5000 INJECTION INTRAVENOUS; SUBCUTANEOUS EVERY 12 HOURS
Qty: 0 | Refills: 0 | Status: DISCONTINUED | OUTPATIENT
Start: 2019-04-30 | End: 2019-04-30

## 2019-04-30 RX ADMIN — Medication 600 MILLIGRAM(S): at 13:08

## 2019-04-30 RX ADMIN — Medication 200 MICROGRAM(S): at 05:19

## 2019-04-30 RX ADMIN — PIPERACILLIN AND TAZOBACTAM 25 GRAM(S): 4; .5 INJECTION, POWDER, LYOPHILIZED, FOR SOLUTION INTRAVENOUS at 05:19

## 2019-04-30 NOTE — DISCHARGE NOTE PROVIDER - NSDCFUADDAPPT_GEN_ALL_CORE_FT
- Follow up with Dr. Calloway in office in 1 week  - Follow up with interventional radiology Dr. Werner in 1 week([phone]) - Follow up with Dr. Calloway in office in 1 week  - Follow up with interventional radiology Dr. Werner on MAY 6th at 11am([phone])

## 2019-04-30 NOTE — PROGRESS NOTE ADULT - PROBLEM SELECTOR PLAN 1
CV: Hemodynamically stable, monitor VS  Pulm: Saturating well on RA.  ID: afebrile, leukocytosis downtrending, transition to PO abx today. F/u wound cx (4/29) Blood cx (4/23): neg  GI: reg diet as tolerated  : Ucx negative x2. UA with leuk esterase, mod bacteria. No sx  Heme: no sx of anemia. SQH for DVT prophylaxis  Analgesia prn  Dispo: eval for discharge today if continues to improve    S Kingston, R3

## 2019-04-30 NOTE — DISCHARGE NOTE PROVIDER - CARE PROVIDER_API CALL
Alton Werner)  [facility]  [id number] [place] [address]  Phone: [phone]  Fax: [phone]  Follow Up Time:     Nicola Calloway)  Obstetrics and Gynecology  [address] [facility], [address] [place] [address]  Phone: [phone]  Fax: [phone]  Follow Up Time:

## 2019-04-30 NOTE — DISCHARGE NOTE NURSING/CASE MANAGEMENT/SOCIAL WORK - NSDCFUADDAPPT_GEN_ALL_CORE_FT
- Follow up with Dr. Calloway in office in 1 week  - Follow up with interventional radiology Dr. Werner on MAY 6th at 11am([phone])

## 2019-04-30 NOTE — DISCHARGE NOTE NURSING/CASE MANAGEMENT/SOCIAL WORK - NSDCDPATPORTLINK_GEN_ALL_CORE
You can access the [facility], offered by [facility], by registering with the following website: [email or web address]

## 2019-04-30 NOTE — PROGRESS NOTE ADULT - REASON FOR ADMISSION
pelvic collection
postpartum infection

## 2019-04-30 NOTE — DISCHARGE NOTE PROVIDER - NSDCCPCAREPLAN_GEN_ALL_CORE_FT
PRINCIPAL DISCHARGE DIAGNOSIS  Diagnosis: Endometritis following delivery  Assessment and Plan of Treatment: Endometritis following delivery

## 2019-04-30 NOTE — DISCHARGE NOTE PROVIDER - HOSPITAL COURSE
27y/o  postpartum day 10 presented with low grade fevers at home, abdominal pain, and leukocytosis. She was started on Zosyn and cultures were sent. CT showed likely rectus hematoma, without intra-abdominal abscess. On HD#3, patient had a low grade fever; however and HD4 had another low grade fever; decision was made to repeat CT scan; showing 5.9x2.4cm collection in the anterior abdominal wall; IR was then consulted and Dr. Werner placed IR drain in RLQ. RENNY drain still intact and is to remain for 1 week per IR, despite low output. Patient has been afebrile and vital signs stable. Denies abdominal pain. Pt is to be discharged home with Augmentin BID for 10 days and close outpatient follow up by OB in 1 week and Dr Werner (IR) in 1 week for drain removal.

## 2019-04-30 NOTE — DISCHARGE NOTE PROVIDER - NSDCFUADDINST_GEN_ALL_CORE_FT
- Flush RENNY drain with 5cc sterile normal saline daily per IR  -RENNY care  -Return with fevers, chills, foul smelling discharge, abdominal pain.

## 2019-04-30 NOTE — PROGRESS NOTE ADULT - SUBJECTIVE AND OBJECTIVE BOX
R3 Postpartum Note    Patient evaluated in chair at bedside. S/p IR drainage yesterday, drain left in place. She denies overnight fevers, chills, chest pain, or palpitations. Abdominal pain is absent at this time.   Patient is ambulating, voiding spontaneously, passing flatus, and tolerating regular diet.     Vital Signs Last 24 Hrs  T(C): 36.8 (30 Apr 2019 05:54), Max: 37.2 (29 Apr 2019 07:02)  T(F): 98.2 (30 Apr 2019 05:54), Max: 99 (29 Apr 2019 07:02)  HR: 78 (30 Apr 2019 05:54) (61 - 98)  BP: 116/65 (30 Apr 2019 05:54) (100/51 - 116/65)  BP(mean): --  RR: 18 (30 Apr 2019 05:54) (18 - 20)  SpO2: 98% (30 Apr 2019 05:54) (98% - 100%)    Physical Exam  General: NAD  Heart: RRR  Lungs: CTAB  Abdomen: Soft, nontender, no rebound or guarding non-distended, fundus firm. RLQ bulb draining minimal serosanguinous fluid  Incision: Pfannenstiel incision CDI, subcuticular suture closure  Ext: 1+ BECKY, no calf tenderness bilaterally    Labs:    Blood Type: O Positive  Antibody Screen: Negative  RPR: Negative                              11.9   13.13 )-----------( 461      ( 30 Apr 2019 05:11 )             38.1                           11.6   17.84 )-----------( 349      ( 28 Apr 2019 06:15 )             37.4                11.2   18.09 )-----------( 342      ( 27 Apr 2019 06:33 )             34.8                  10.2   15.90 )-----------( 289      ( 04-26 @ 05:32 )             32.2                10.6   21.33 )-----------( 258      ( 04-25 @ 06:15 )             34.2                11.6   22.67 )-----------( 246      ( 04-24 @ 10:00 )             37.3                             11.2   18.09 )-----------( 342      ( 27 Apr 2019 06:33 )             34.8

## 2019-04-30 NOTE — PROGRESS NOTE ADULT - ASSESSMENT
29 yo , h/o polycystic kidney disease s/p L nephrectomy (), and Graves Dz s/p thyroidectomy, POD#10 s/p rLTCS and bilateral tubal ligation admitted with abdominal pain and low grade fevers and imaging consistent with collection in anterior abdominal wall, likely rectus hematoma. Patient was persistently febrile on antibiotics,  CT was repeated with findings of increasing collection size. Pt is now POD#1 s/p IR drainage, and continues to improve

## 2019-05-02 ENCOUNTER — CHART COPY (OUTPATIENT)
Age: 29
End: 2019-05-02

## 2019-05-03 LAB
CULTURE - SURGICAL SITE: SIGNIFICANT CHANGE UP
SPECIMEN SOURCE: SIGNIFICANT CHANGE UP

## 2019-05-06 ENCOUNTER — OUTPATIENT (OUTPATIENT)
Dept: OUTPATIENT SERVICES | Facility: HOSPITAL | Age: 29
LOS: 1 days | End: 2019-05-06

## 2019-05-06 ENCOUNTER — APPOINTMENT (OUTPATIENT)
Dept: CT IMAGING | Facility: HOSPITAL | Age: 29
End: 2019-05-06
Payer: MEDICAID

## 2019-05-06 DIAGNOSIS — R18.8 OTHER ASCITES: ICD-10-CM

## 2019-05-06 DIAGNOSIS — Z98.890 OTHER SPECIFIED POSTPROCEDURAL STATES: Chronic | ICD-10-CM

## 2019-05-06 DIAGNOSIS — Z90.5 ACQUIRED ABSENCE OF KIDNEY: Chronic | ICD-10-CM

## 2019-05-06 DIAGNOSIS — Z98.891 HISTORY OF UTERINE SCAR FROM PREVIOUS SURGERY: Chronic | ICD-10-CM

## 2019-05-06 PROCEDURE — 49424 ASSESS CYST CONTRAST INJECT: CPT

## 2019-05-06 PROCEDURE — 76080 X-RAY EXAM OF FISTULA: CPT | Mod: 26

## 2019-05-06 PROCEDURE — 74150 CT ABDOMEN W/O CONTRAST: CPT | Mod: 26

## 2019-05-08 DIAGNOSIS — Z43.8 ENCOUNTER FOR ATTENTION TO OTHER ARTIFICIAL OPENINGS: ICD-10-CM

## 2019-05-08 DIAGNOSIS — L02.211 CUTANEOUS ABSCESS OF ABDOMINAL WALL: ICD-10-CM

## 2019-05-09 LAB — SURGICAL PATHOLOGY STUDY: SIGNIFICANT CHANGE UP

## 2019-05-29 LAB — FUNGUS SPEC QL CULT: SIGNIFICANT CHANGE UP

## 2019-08-22 RX ORDER — LEVOTHYROXINE SODIUM 0.2 MG/1
200 TABLET ORAL
Qty: 90 | Refills: 3 | Status: ACTIVE | COMMUNITY
Start: 2018-10-16

## 2019-09-01 NOTE — DISCHARGE NOTE OB - PLAN OF CARE
resolution of infection continue antibiotics as prescribed  Call your doctor with fevers, chills, intractable nausea/vomiting, severe pain not relieved by Tylenol or Motrin or other acute concerns none

## 2019-09-19 ENCOUNTER — TRANSCRIPTION ENCOUNTER (OUTPATIENT)
Age: 29
End: 2019-09-19

## 2019-09-27 ENCOUNTER — APPOINTMENT (OUTPATIENT)
Dept: ENDOCRINOLOGY | Facility: CLINIC | Age: 29
End: 2019-09-27
Payer: MEDICAID

## 2019-09-27 VITALS
HEIGHT: 60 IN | WEIGHT: 190 LBS | OXYGEN SATURATION: 98 % | DIASTOLIC BLOOD PRESSURE: 80 MMHG | HEART RATE: 72 BPM | BODY MASS INDEX: 37.3 KG/M2 | SYSTOLIC BLOOD PRESSURE: 120 MMHG

## 2019-09-27 PROCEDURE — 99214 OFFICE O/P EST MOD 30 MIN: CPT

## 2019-09-27 NOTE — PHYSICAL EXAM
[Alert] : alert [No Acute Distress] : no acute distress [Well Nourished] : well nourished [Well Developed] : well developed [Normal Sclera/Conjunctiva] : normal sclera/conjunctiva [PERRL] : pupils equal, round and reactive to light [EOMI] : extra ocular movement intact [Normal Outer Ear/Nose] : the ears and nose were normal in appearance [No Proptosis] : no proptosis [Normal TMs] : both tympanic membranes were normal [Normal Hearing] : hearing was normal [No Neck Mass] : no neck mass was observed [Supple] : the neck was supple [Thyroid Not Enlarged] : the thyroid was not enlarged [Normal Rate and Effort] : normal respiratory rhythm and effort [No Accessory Muscle Use] : no accessory muscle use [Clear to Auscultation] : lungs were clear to auscultation bilaterally [Normal Rate] : heart rate was normal  [Normal S1, S2] : normal S1 and S2 [Regular Rhythm] : with a regular rhythm [Normal Bowel Sounds] : normal bowel sounds [Not Tender] : non-tender [Soft] : abdomen soft [Not Distended] : not distended [No CVA Tenderness] : no ~M costovertebral angle tenderness [Normal Gait] : normal gait [No Joint Swelling] : no joint swelling seen [No Clubbing, Cyanosis] : no clubbing  or cyanosis of the fingernails [Normal Strength/Tone] : muscle strength and tone were normal [No Rash] : no rash [Normal Reflexes] : deep tendon reflexes were 2+ and symmetric [No Motor Deficits] : the motor exam was normal [No Tremors] : no tremors [Oriented x3] : oriented to person, place, and time [Normal Insight/Judgement] : insight and judgment were intact [Normal Affect] : the affect was normal [Normal Mood] : the mood was normal

## 2019-09-27 NOTE — ASSESSMENT
[FreeTextEntry1] : 28-year-old female with hypothyroidism after thyroidectomy 2/2 Graves, now 5 months post-partum \par Will check TFTs \par Continue levothyroxine 200 mcg daily , and will adjust as necessary \par \par History of hyperprolactemia\par -Will check prolactin levels\par -If elevated may obtain baseline pituitary MRI \par \par Follow up in 6 months

## 2019-09-27 NOTE — HISTORY OF PRESENT ILLNESS
[FreeTextEntry1] : 28-year-old female for follow-up of hypothyroidism. She was treated for Graves' disease with radioactive iodine which did not work and had a thyroidectomy in August of 2015. 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

## 2019-09-29 LAB
ANION GAP SERPL CALC-SCNC: 15 MMOL/L
BUN SERPL-MCNC: 12 MG/DL
CALCIUM SERPL-MCNC: 8.7 MG/DL
CHLORIDE SERPL-SCNC: 103 MMOL/L
CO2 SERPL-SCNC: 22 MMOL/L
CREAT SERPL-MCNC: 0.61 MG/DL
GLUCOSE SERPL-MCNC: 59 MG/DL
POTASSIUM SERPL-SCNC: 4.2 MMOL/L
PROLACTIN SERPL-MCNC: 9.9 NG/ML
SODIUM SERPL-SCNC: 140 MMOL/L
T4 FREE SERPL-MCNC: 2.8 NG/DL
TSH SERPL-ACNC: 0.04 UIU/ML

## 2019-09-29 RX ORDER — LEVOTHYROXINE SODIUM 0.17 MG/1
175 TABLET ORAL DAILY
Qty: 1 | Refills: 1 | Status: ACTIVE | COMMUNITY
Start: 2019-09-29 | End: 1900-01-01

## 2019-10-17 ENCOUNTER — OTHER (OUTPATIENT)
Age: 29
End: 2019-10-17

## 2019-10-29 ENCOUNTER — OTHER (OUTPATIENT)
Age: 29
End: 2019-10-29

## 2020-03-27 ENCOUNTER — APPOINTMENT (OUTPATIENT)
Dept: ENDOCRINOLOGY | Facility: CLINIC | Age: 30
End: 2020-03-27

## 2020-08-05 ENCOUNTER — APPOINTMENT (OUTPATIENT)
Dept: ENDOCRINOLOGY | Facility: CLINIC | Age: 30
End: 2020-08-05
Payer: MEDICAID

## 2020-08-05 VITALS
HEART RATE: 98 BPM | BODY MASS INDEX: 34.36 KG/M2 | SYSTOLIC BLOOD PRESSURE: 140 MMHG | DIASTOLIC BLOOD PRESSURE: 64 MMHG | WEIGHT: 175 LBS | OXYGEN SATURATION: 98 % | TEMPERATURE: 98.4 F | HEIGHT: 60 IN

## 2020-08-05 DIAGNOSIS — E03.9 HYPOTHYROIDISM, UNSPECIFIED: ICD-10-CM

## 2020-08-05 DIAGNOSIS — E22.1 HYPERPROLACTINEMIA: ICD-10-CM

## 2020-08-05 PROCEDURE — 99214 OFFICE O/P EST MOD 30 MIN: CPT

## 2020-08-05 NOTE — HISTORY OF PRESENT ILLNESS
[FreeTextEntry1] : 29-year-old female for follow-up of hypothyroidism. She was treated for Graves' disease with radioactive iodine which did not work and had a thyroidectomy in August of 2015. 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

## 2020-08-05 NOTE — REVIEW OF SYSTEMS
[Fatigue] : no fatigue [Recent Weight Gain (___ Lbs)] : no recent weight gain [Decreased Appetite] : appetite not decreased [Dry Eyes] : no dryness [Visual Field Defect] : no visual field defect [Recent Weight Loss (___ Lbs)] : no recent weight loss [Dysphagia] : no dysphagia [Neck Pain] : no neck pain [Dysphonia] : no dysphonia [Nasal Congestion] : no nasal congestion [Chest Pain] : no chest pain [Slow Heart Rate] : heart rate is not slow [Palpitations] : no palpitations [Shortness Of Breath] : no shortness of breath [Nausea] : no nausea [Constipation] : no constipation [Diarrhea] : no diarrhea [Vomiting] : no vomiting [Polyuria] : no polyuria [Irregular Menses] : regular menses [Joint Pain] : no joint pain [Muscle Weakness] : no muscle weakness [Acanthosis] : no acanthosis  [Headaches] : no headaches [Dizziness] : no dizziness [Tremors] : no tremors [Cold Intolerance] : no cold intolerance [Polydipsia] : no polydipsia [Easy Bleeding] : no ~M tendency for easy bleeding [Easy Bruising] : no tendency for easy bruising

## 2020-08-05 NOTE — ASSESSMENT
[FreeTextEntry1] : 29-year-old female with hypothyroidism after thyroidectomy 2/2 Graves disease.\par Most recent TSh of 0.01\par -Will decrease the levothyroxine to 200 mcg ( 6.5 tabs per week) \par -Recheck TFTs in 8 weeks \par \par History of hyperprolactemia\par -Will check prolactin levels\par -If elevated may obtain baseline pituitary MRI \par \par Follow up in 6 months. \par

## 2020-08-05 NOTE — PHYSICAL EXAM
[Alert] : alert [Well Nourished] : well nourished [No Acute Distress] : no acute distress [Normal Sclera/Conjunctiva] : normal sclera/conjunctiva [EOMI] : extra ocular movement intact [PERRL] : pupils equal, round and reactive to light [Normal Outer Ear/Nose] : the ears and nose were normal in appearance [Normal Hearing] : hearing was normal [Normal TMs] : both tympanic membranes were normal [No Neck Mass] : no neck mass was observed [Thyroid Not Enlarged] : the thyroid was not enlarged [No Respiratory Distress] : no respiratory distress [Clear to Auscultation] : lungs were clear to auscultation bilaterally [Normal S1, S2] : normal S1 and S2 [Normal Rate] : heart rate was normal [Regular Rhythm] : with a regular rhythm [Normal Bowel Sounds] : normal bowel sounds [Not Tender] : non-tender [Soft] : abdomen soft [Normal Gait] : normal gait [No Clubbing, Cyanosis] : no clubbing  or cyanosis of the fingernails [No Joint Swelling] : no joint swelling seen [No Rash] : no rash [No Skin Lesions] : no skin lesions [No Motor Deficits] : the motor exam was normal [Normal Reflexes] : deep tendon reflexes were 2+ and symmetric [No Tremors] : no tremors [Normal Insight/Judgement] : insight and judgment were intact [Normal Affect] : the affect was normal [Oriented x3] : oriented to person, place, and time [Normal Mood] : the mood was normal

## 2020-11-16 ENCOUNTER — RX RENEWAL (OUTPATIENT)
Age: 30
End: 2020-11-16

## 2021-02-05 ENCOUNTER — APPOINTMENT (OUTPATIENT)
Dept: ENDOCRINOLOGY | Facility: CLINIC | Age: 31
End: 2021-02-05

## 2021-05-13 ENCOUNTER — RX RENEWAL (OUTPATIENT)
Age: 31
End: 2021-05-13

## 2021-05-13 RX ORDER — LEVOTHYROXINE SODIUM 200 UG/1
200 TABLET ORAL
Qty: 90 | Refills: 1 | Status: ACTIVE | COMMUNITY
Start: 2019-10-29 | End: 1900-01-01

## 2022-06-06 NOTE — OB RN PATIENT PROFILE - AMNIOTIC FLUID COLOR, LABOR
took history, reviewed exam findings and x-rays with ER staff, discussed treatment plan, and follow up care with ER staff. intact

## 2022-07-18 NOTE — OB NEONATOLOGY/PEDIATRICIAN DELIVERY SUMMARY - BABY A: APGAR 1 MIN SCORE, DELIVERY
9 Elidel Counseling: Patient may experience a mild burning sensation during topical application. Elidel is not approved in children less than 2 years of age. There have been case reports of hematologic and skin malignancies in patients using topical calcineurin inhibitors although causality is questionable.

## 2023-01-30 ENCOUNTER — NON-APPOINTMENT (OUTPATIENT)
Age: 33
End: 2023-01-30

## 2023-07-24 NOTE — LACTATION INITIAL EVALUATION - REQUESTED BY
Lactation rounding. MEDICATIONS  (STANDING):  OLANZapine 5 milliGRAM(s) Oral daily    MEDICATIONS  (PRN):  acetaminophen     Tablet .. 650 milliGRAM(s) Oral every 6 hours PRN Temp greater or equal to 38C (100.4F), Mild Pain (1 - 3)  aluminum hydroxide/magnesium hydroxide/simethicone Suspension 30 milliLiter(s) Oral every 4 hours PRN Dyspepsia  diphenhydrAMINE 50 milliGRAM(s) Oral every 6 hours PRN agitation  haloperidol     Tablet 5 milliGRAM(s) Oral every 6 hours PRN agitation  hydrOXYzine hydrochloride 50 milliGRAM(s) Oral every 6 hours PRN Anxiety  LORazepam     Tablet 2 milliGRAM(s) Oral every 6 hours PRN Agitation  magnesium hydroxide Suspension 30 milliLiter(s) Oral daily PRN Constipation  traZODone 50 milliGRAM(s) Oral at bedtime PRN insomnia

## 2024-03-22 ENCOUNTER — NON-APPOINTMENT (OUTPATIENT)
Age: 34
End: 2024-03-22

## 2024-05-18 ENCOUNTER — NON-APPOINTMENT (OUTPATIENT)
Age: 34
End: 2024-05-18

## 2024-12-10 ENCOUNTER — APPOINTMENT (OUTPATIENT)
Dept: ENDOCRINOLOGY | Facility: CLINIC | Age: 34
End: 2024-12-10
Payer: MEDICAID

## 2024-12-10 ENCOUNTER — NON-APPOINTMENT (OUTPATIENT)
Age: 34
End: 2024-12-10

## 2024-12-10 VITALS
BODY MASS INDEX: 32.28 KG/M2 | DIASTOLIC BLOOD PRESSURE: 72 MMHG | SYSTOLIC BLOOD PRESSURE: 110 MMHG | OXYGEN SATURATION: 97 % | WEIGHT: 171 LBS | HEIGHT: 61 IN | HEART RATE: 58 BPM

## 2024-12-10 DIAGNOSIS — E89.0 POSTPROCEDURAL HYPOTHYROIDISM: ICD-10-CM

## 2024-12-10 DIAGNOSIS — E28.2 POLYCYSTIC OVARIAN SYNDROME: ICD-10-CM

## 2024-12-10 DIAGNOSIS — E22.1 HYPERPROLACTINEMIA: ICD-10-CM

## 2024-12-10 PROCEDURE — 99204 OFFICE O/P NEW MOD 45 MIN: CPT

## 2024-12-10 RX ORDER — METFORMIN HYDROCHLORIDE 500 MG/1
500 TABLET, COATED ORAL
Qty: 360 | Refills: 2 | Status: ACTIVE | COMMUNITY
Start: 2024-12-10 | End: 1900-01-01

## 2025-07-07 ENCOUNTER — APPOINTMENT (OUTPATIENT)
Dept: ENDOCRINOLOGY | Facility: CLINIC | Age: 35
End: 2025-07-07